# Patient Record
Sex: MALE | Race: WHITE | Employment: FULL TIME | URBAN - METROPOLITAN AREA
[De-identification: names, ages, dates, MRNs, and addresses within clinical notes are randomized per-mention and may not be internally consistent; named-entity substitution may affect disease eponyms.]

---

## 2018-01-19 ENCOUNTER — OFFICE VISIT (OUTPATIENT)
Dept: FAMILY MEDICINE CLINIC | Age: 48
End: 2018-01-19

## 2018-01-19 VITALS
HEART RATE: 70 BPM | SYSTOLIC BLOOD PRESSURE: 130 MMHG | OXYGEN SATURATION: 98 % | RESPIRATION RATE: 16 BRPM | TEMPERATURE: 97.6 F | DIASTOLIC BLOOD PRESSURE: 86 MMHG | WEIGHT: 189 LBS | BODY MASS INDEX: 26.46 KG/M2 | HEIGHT: 71 IN

## 2018-01-19 DIAGNOSIS — R53.83 FEELING TIRED: Primary | ICD-10-CM

## 2018-01-19 DIAGNOSIS — F17.200 SMOKER: ICD-10-CM

## 2018-01-19 DIAGNOSIS — E78.1 HYPERTRIGLYCERIDEMIA: ICD-10-CM

## 2018-01-19 DIAGNOSIS — M51.26 LUMBAR DISC HERNIATION: ICD-10-CM

## 2018-01-19 DIAGNOSIS — Z23 ENCOUNTER FOR IMMUNIZATION: ICD-10-CM

## 2018-01-19 DIAGNOSIS — Z13.1 SCREENING FOR DIABETES MELLITUS (DM): ICD-10-CM

## 2018-01-19 DIAGNOSIS — M25.50 POLYARTHRALGIA: ICD-10-CM

## 2018-01-19 DIAGNOSIS — Z86.19 HISTORY OF ROCKY MOUNTAIN SPOTTED FEVER: ICD-10-CM

## 2018-01-19 NOTE — LETTER
3/6/2018 7:02 PM 
 
Mr. Jaz Almeida [address] [place] [id number] Dear Jaz Almeida: 
 
Please find your most recent results below. Resulted Orders CBC WITH AUTOMATED DIFF Result Value Ref Range WBC 7.1 3.4 - 10.8 x10E3/uL  
 RBC 5.14 4.14 - 5.80 x10E6/uL HGB 15.9 13.0 - 17.7 g/dL HCT 48.1 37.5 - 51.0 % MCV 94 79 - 97 fL  
 MCH 30.9 26.6 - 33.0 pg  
 MCHC 33.1 31.5 - 35.7 g/dL  
 RDW 13.5 12.3 - 15.4 % PLATELET 532 325 - 244 x10E3/uL NEUTROPHILS 65 Not Estab. % Lymphocytes 20 Not Estab. % MONOCYTES 12 Not Estab. % EOSINOPHILS 3 Not Estab. % BASOPHILS 0 Not Estab. %  
 ABS. NEUTROPHILS 4.6 1.4 - 7.0 x10E3/uL Abs Lymphocytes 1.4 0.7 - 3.1 x10E3/uL  
 ABS. MONOCYTES 0.8 0.1 - 0.9 x10E3/uL  
 ABS. EOSINOPHILS 0.2 0.0 - 0.4 x10E3/uL  
 ABS. BASOPHILS 0.0 0.0 - 0.2 x10E3/uL IMMATURE GRANULOCYTES 0 Not Estab. %  
 ABS. IMM. GRANS. 0.0 0.0 - 0.1 x10E3/uL Narrative Performed at:  [address]  [id number] : Mirian Robertson MD, Phone:  [phone] METABOLIC PANEL, COMPREHENSIVE Result Value Ref Range Glucose 99 65 - 99 mg/dL BUN 10 6 - 24 mg/dL Creatinine 0.87 0.76 - 1.27 mg/dL GFR est non- >59 GFR est  >59 BUN/Creatinine ratio 11 9 - 20 Sodium 138 134 - 144 mmol/L Potassium 4.6 3.5 - 5.2 mmol/L Chloride 101 96 - 106 mmol/L  
 CO2 21 18 - 29 mmol/L Calcium 9.4 8.7 - 10.2 mg/dL Protein, total 6.4 6.0 - 8.5 g/dL Albumin 4.2 3.5 - 5.5 g/dL GLOBULIN, TOTAL 2.2 1.5 - 4.5 A-G Ratio 1.9 1.2 - 2.2 Bilirubin, total 0.8 0.0 - 1.2 mg/dL Alk. phosphatase 78 39 - 117 IU/L  
 AST (SGOT) 25 0 - 40 IU/L  
 ALT (SGPT) 23 0 - 44 IU/L Narrative Performed at:  [address]  [id number] : Mirian Robertson MD, Phone:  [phone] LIPID PANEL Result Value Ref Range Cholesterol, total 165 100 - 199 mg/dL Triglyceride 128 0 - 149 mg/dL HDL Cholesterol 42 >39 mg/dL VLDL, calculated 26 5 - 40 LDL, calculated 97 0 - 99 Narrative Performed at:  [address]  [id number] : Dot Abbasi MD, Phone:  [phone] TESTOSTERONE, FREE & TOTAL Result Value Ref Range Testosterone 713 264 - 916 ng/dL Comment:  
   Adult male reference interval is based on a population of 
healthy nonobese males (BMI <30) between 23and 44years old. [address] [place] [address], [address] [place] [address] [phone]. PMID: 94015432. Free testosterone (Direct) 12.1 6.8 - 21.5 pg/mL Narrative Performed at:  [address]  [id number] : Dot Abbasi MD, Phone:  [phone] VITAMIN B12 & FOLATE Result Value Ref Range Vitamin B12 277 232 - 1245 pg/mL Folate 7.1 >3.0 ng/mL Comment: A serum folate concentration of less than 3.1 ng/mL is 
considered to represent clinical deficiency. Narrative Performed at:  [address]  [id number] : Dot Abbasi MD, Phone:  [phone] R RICKETTSII IGM Result Value Ref Range R. rickettsii IgM 1.46 (H) 0.00 - 0.89 index Comment:  
                                    Negative        <0.90 Equivocal 0.90 - 1.10 Positive        >1.10 Narrative Performed at:  [address]  [id number] : Dot Abbasi MD, Phone:  [phone] HEMOGLOBIN A1C W/O EAG Result Value Ref Range Hemoglobin A1c 5.0 4.8 - 5.6 % Comment:  
            Pre-diabetes: 5.7 - 6.4 Diabetes: >6.4 Glycemic control for adults with diabetes: <7.0 Narrative Performed at:  [place] [address] [address]  [id number] : Placido Cole MD, Phone:  [phone] VITAMIN D, 25 HYDROXY Result Value Ref Range VITAMIN D, 25-HYDROXY 29.5 (L) 30.0 - 100.0 ng/mL Comment:  
   Vitamin D deficiency has been defined by the [address] [place] [address] practice guideline as a 
level of serum 25-OH vitamin D less than 20 ng/mL (1,2). The Endocrine Society went on to further define vitamin D 
insufficiency as a level between 21 and 29 ng/mL (2). 1. IOM ([place] of Medicine). 2010. Dietary reference 
   intakes for calcium and D. [address] [place] [address]: ShopClues.com. 2. Amelia MF, Celina NC, Angeli CLEMENT, et al. 
   Evaluation, treatment, and prevention of vitamin D 
   deficiency: an Endocrine Society clinical practice 
   guideline. JCEM. 2011 Jul; 96(7):1911-30. Narrative Performed at:  [address]  [id number] : Placido Cole MD, Phone:  [phone] TSH 3RD GENERATION Result Value Ref Range TSH 1.020 0.450 - 4.500 uIU/mL Narrative Performed at:  [address]  [id number] : Placido Cole MD, Phone:  [phone] LYME AB TOTAL W/RFLX W BLOT Result Value Ref Range Lyme Ab, IgG/IgM <0.91 0.00 - 0.90 ISR Comment:  
                                   Negative         <0.91 Equivocal  0.91 - 1.09 Positive         >1.09 Narrative Performed at:  [address]  [id number] : Placido Cole MD, Phone:  [phone] SED RATE (ESR) Result Value Ref Range Sed rate (ESR) 7 0 - 15 mm/hr Narrative Performed at:  [address]  [id number] : Placido Cole MD, Phone:  [phone] CVD REPORT  
 Result Value Ref Range INTERPRETATION Note Comment:  
   Supplemental report is available. Narrative Performed at:  [address] [place] [address]  [id number] : Shoshana Galloway PhD, Phone:  [phone] RECOMMENDATIONS:Your labs show sone abnormalities (low Vit B 12 and Vit D) Appears to have antibody for mathew mountain spotted fever, however unclear if active and would require treatment. Please call the office and schedule an appointment. I have been unable to contact you by phone. Please call me if you have any questions: [phone] Sincerely, 
 
 
Joanne Al MD

## 2018-01-19 NOTE — PROGRESS NOTES
1. Have you been to the ER, urgent care clinic since your last visit? Hospitalized since your last visit? No    2. Have you seen or consulted any other health care providers outside of the [address] [place] [address] since your last visit? Include any pap smears or colon screening. No  Flulaval 0.5 ml given IM in left deltoid. Lot # [id number], exp date 06/30/2018. Patient tolerated injection well. No adverse reaction noted.

## 2018-01-19 NOTE — MR AVS SNAPSHOT
[facility] [place] [address] [address] [facility] 
[phone] Patient: Jaz Almeida MRN: [id number] :1970 Visit Information Date & Time Provider Department Dept. Phone Encounter #  
 2018  1:30 PM Too [place], [address] [id number] Follow-up Instructions Return in about 3 weeks (around 2018), or if symptoms worsen or fail to improve. Upcoming Health Maintenance Date Due Pneumococcal 19-64 Medium Risk (1 of 1 - PPSV23) 10/14/1987 Influenza Age 5 to Adult 2017 DTaP/Tdap/Td series (2 - Td) 2019 Allergies as of 2018  Review Complete On: 2018 By: Sherin Donis LPN No Known Allergies Current Immunizations  Never Reviewed Name Date Tdap 2009 Not reviewed this visit You Were Diagnosed With   
  
 Codes Comments Feeling tired    -  Primary ICD-10-CM: R53.83 ICD-9-CM: 780.79 Hypertriglyceridemia     ICD-10-CM: E78.1 ICD-9-CM: 272.1 Polyarthralgia     ICD-10-CM: M25.50 ICD-9-CM: 719.49 Screening for diabetes mellitus (DM)     ICD-10-CM: Z13.1 ICD-9-CM: V77.1 History of Jose E Mountain spotted fever     ICD-10-CM: Z86.19 ICD-9-CM: V12.09 Lumbar disc herniation     ICD-10-CM: M51.26 
ICD-9-CM: 722.10 Smoker     ICD-10-CM: E96.374 ICD-9-CM: 305.1 Vitals BP Pulse Temp Resp Height(growth percentile) Weight(growth percentile) 130/86 (BP 1 Location: Left arm, BP Patient Position: Sitting) 70 97.6 °F (36.4 °C) (Oral) 16 5' 11\" (1.803 m) 189 lb (85.7 kg) SpO2 BMI Smoking Status 98% 26.36 kg/m2 Current Every Day Smoker Vitals History BMI and BSA Data Body Mass Index Body Surface Area  
 26.36 kg/m 2 2.07 m 2 Your Updated Medication List  
  
Notice  As of 2018  2:10 PM  
 You have not been prescribed any medications. Follow-up Instructions Return in about 3 weeks (around 2/9/2018), or if symptoms worsen or fail to improve. To-Do List   
 01/19/2018 Lab:  CBC WITH AUTOMATED DIFF   
  
 01/19/2018 Lab:  HEMOGLOBIN A1C W/O EAG   
  
 01/19/2018 Lab:  LIPID PANEL   
  
 01/19/2018 Lab:  LYME AB TOTAL W/RFLX W BLOT   
  
 01/19/2018 Lab:  METABOLIC PANEL, COMPREHENSIVE   
  
 01/19/2018 Lab:  R RICKETTSII IGM   
  
 01/19/2018 Lab:  SED RATE (ESR)   
  
 01/19/2018 Lab:  TESTOSTERONE, FREE & TOTAL   
  
 01/19/2018 Lab:  TSH 3RD GENERATION   
  
 01/19/2018 Lab:  VITAMIN B12 & FOLATE   
  
 01/19/2018 Lab:  VITAMIN D, 25 HYDROXY Patient Instructions Stopping Smoking: Care Instructions Your Care Instructions Cigarette smokers crave the nicotine in cigarettes. Giving it up is much harder than simply changing a habit. Your body has to stop craving the nicotine. It is hard to quit, but you can do it. There are many tools that people use to quit smoking. You may find that combining tools works best for you. There are several steps to quitting. First you get ready to quit. Then you get support to help you. After that, you learn new skills and behaviors to become a nonsmoker. For many people, a necessary step is getting and using medicine. Your doctor will help you set up the plan that best meets your needs. You may want to attend a smoking cessation program to help you quit smoking. When you choose a program, look for one that has proven success. Ask your doctor for ideas. You will greatly increase your chances of success if you take medicine as well as get counseling or join a cessation program. 
Some of the changes you feel when you first quit tobacco are uncomfortable. Your body will miss the nicotine at first, and you may feel short-tempered and grumpy. You may have trouble sleeping or concentrating. Medicine can help you deal with these symptoms. You may struggle with changing your smoking habits and rituals. The last step is the tricky one: Be prepared for the smoking urge to continue for a time. This is a lot to deal with, but keep at it. You will feel better. Follow-up care is a key part of your treatment and safety. Be sure to make and go to all appointments, and call your doctor if you are having problems. It's also a good idea to know your test results and keep a list of the medicines you take. How can you care for yourself at home? · Ask your family, friends, and coworkers for support. You have a better chance of quitting if you have help and support. · Join a support group, such as Nicotine Anonymous, for people who are trying to quit smoking. · Consider signing up for a smoking cessation program, such as the American Lung Association's Freedom from Smoking program. 
· Set a quit date. Pick your date carefully so that it is not right in the middle of a big deadline or stressful time. Once you quit, do not even take a puff. Get rid of all ashtrays and lighters after your last cigarette. Clean your house and your clothes so that they do not smell of smoke. · Learn how to be a nonsmoker. Think about ways you can avoid those things that make you reach for a cigarette. ¨ Avoid situations that put you at greatest risk for smoking. For some people, it is hard to have a drink with friends without smoking. For others, they might skip a coffee break with coworkers who smoke. ¨ Change your daily routine. Take a different route to work or eat a meal in a different place. · Cut down on stress. Calm yourself or release tension by doing an activity you enjoy, such as reading a book, taking a hot bath, or gardening. · Talk to your doctor or pharmacist about nicotine replacement therapy, which replaces the nicotine in your body.  You still get nicotine but you do not use tobacco. Nicotine replacement products help you slowly reduce the amount of nicotine you need. These products come in several forms, many of them available over-the-counter: ¨ Nicotine patches ¨ Nicotine gum and lozenges ¨ Nicotine inhaler · Ask your doctor about bupropion (Wellbutrin) or varenicline (Chantix), which are prescription medicines. They do not contain nicotine. They help you by reducing withdrawal symptoms, such as stress and anxiety. · Some people find hypnosis, acupuncture, and massage helpful for ending the smoking habit. · Eat a healthy diet and get regular exercise. Having healthy habits will help your body move past its craving for nicotine. · Be prepared to keep trying. Most people are not successful the first few times they try to quit. Do not get mad at yourself if you smoke again. Make a list of things you learned and think about when you want to try again, such as next week, next month, or next year. Where can you learn more? Go to http://jyotsna-amy.info/. Enter X445 in the search box to learn more about \"Stopping Smoking: Care Instructions. \" Current as of: March 20, 2017 Content Version: 11.4 © 2303-6849 FRESS. Care instructions adapted under license by [employer] (which disclaims liability or warranty for this information). If you have questions about a medical condition or this instruction, always ask your healthcare professional. [address] any warranty or liability for your use of this information. Introducing [facility]! Graeme Rodríguez introduces [facility] patient portal. Now you can access parts of your medical record, email your doctor's office, and request medication refills online. 1. In your internet browser, go to [email or web address] 2. Click on the First Time User? Click Here link in the Sign In box. You will see the New Member Sign Up page. 3. Enter your [employer] Access Code exactly as it appears below. You will not need to use this code after youve completed the sign-up process. If you do not sign up before the expiration date, you must request a new code. · [employer] Access Code: [email or web address] Expires: 4/19/2018  2:10 PM 
 
4. Enter the last four digits of your Social Security Number (xxxx) and Date of Birth (mm/dd/yyyy) as indicated and click Submit. You will be taken to the next sign-up page. 5. Create a [employer] ID. This will be your [employer] login ID and cannot be changed, so think of one that is secure and easy to remember. 6. Create a [employer] password. You can change your password at any time. 7. Enter your Password Reset Question and Answer. This can be used at a later time if you forget your password. 8. Enter your e-mail address. You will receive e-mail notification when new information is available in [address] 9. Click Sign Up. You can now view and download portions of your medical record. 10. Click the Download Summary menu link to download a portable copy of your medical information. If you have questions, please visit the Frequently Asked Questions section of the [employer] website. Remember, [employer] is NOT to be used for urgent needs. For medical emergencies, dial 911. Now available from your iPhone and Android! Please provide this summary of care documentation to your next provider. Lyme Disease Testing Disclaimer:   
 § 48.0-1346.4. (Expires July 1, 2018) Lyme disease testing information disclosure. A. Every licensee or his in-office designee who orders a laboratory test for the presence of Lyme disease shall provide to the patient or his legal representative the following written information: \"ACCORDING TO THE CENTERS FOR DISEASE CONTROL AND PREVENTION, AS OF 2011 LYME DISEASE IS THE SIXTH FASTEST GROWING DISEASE IN THE [place]. YOUR HEALTH CARE PROVIDER HAS ORDERED A LABORATORY TEST FOR THE PRESENCE OF LYME DISEASE FOR YOU. CURRENT LABORATORY TESTING FOR LYME DISEASE CAN BE PROBLEMATIC AND STANDARD LABORATORY TESTS OFTEN RESULT IN FALSE NEGATIVE AND FALSE POSITIVE RESULTS, AND IF DONE TOO EARLY, YOU MAY NOT HAVE PRODUCED ENOUGH ANTIBODIES TO BE CONSIDERED POSITIVE BECAUSE YOUR IMMUNE RESPONSE REQUIRES TIME TO DEVELOP ANTIBODIES. IF YOU ARE TESTED FOR LYME DISEASE, AND THE RESULTS ARE NEGATIVE, THIS DOES NOT NECESSARILY MEAN YOU DO NOT HAVE LYME DISEASE. IF YOU CONTINUE TO EXPERIENCE SYMPTOMS, YOU SHOULD CONTACT YOUR HEALTH CARE PROVIDER AND INQUIRE ABOUT THE APPROPRIATENESS OF RETESTING OR ADDITIONAL TREATMENT. \"  
B. Licensees shall be immune from civil liability for the provision of the written information required by this section absent gross negligence or willful misconduct. Your primary care clinician is listed as Amauri Morocho. If you have any questions after today's visit, please call [phone].

## 2018-01-19 NOTE — PROGRESS NOTES
Yanely Card, 50 y.o.,  male    SUBJECTIVE  Wants lab check for fatigue    Has not been seen here for almost 5 years. Fatigue- ongoing for years, no energy, exhausted throughout the day, with aching of joints hips, knees, elbows. Wants to be checked for lymes with history of tick bites [place] He mentions a friend with similar symptoms and being dx with this. He also wanted to be checked for RMSF,  testing positive for mathew mountain spotted fever years ago. He denies fevers, rash. He is a longshoreman, varied night shift worker. Sleep is bothersome. H/o herniated disc, following spine specialist in [place], forgot the name. Says given flexeril. Smoker    ROS:  See HPI, all others negative        Patient Active Problem List   Diagnosis Code    Lumbar disc herniation M51.26    Smoker F17.200           No Known Allergies    Past Medical History:   Diagnosis Date    DDD (degenerative disc disease), lumbar        Social History     Social History    Marital status:      Spouse name: N/A    Number of children: N/A    Years of education: N/A     Occupational History    Not on file. Social History Main Topics    Smoking status: Current Every Day Smoker     Packs/day: 1.00     Years: 15.00    Smokeless tobacco: Never Used    Alcohol use Yes    Drug use: No    Sexual activity: Yes     Partners: Female     Other Topics Concern    Not on file     Social History Narrative       Family History   Problem Relation Age of Onset    Heart Disease Mother     Hypertension Father          OBJECTIVE    Physical Exam:     Visit Vitals    /86 (BP 1 Location: Left arm, BP Patient Position: Sitting)    Pulse 70    Temp 97.6 °F (36.4 °C) (Oral)    Resp 16    Ht 5' 11\" (1.803 m)    Wt 189 lb (85.7 kg)    SpO2 98%    BMI 26.36 kg/m2       General: alert,  in no apparent distress or pain  Head: atraumatic.  Non-tender maxillary and frontal sinuses  Eyes: Lids with no discharge, no matting, conjunctivae clear and non injected, full EOMs, PERLLA  Ears: pinna non-tender, external auditory canal patent, TM intact  Mouth/throat:tonsils non enlarged, pharynx non erythematous and no lesion, nasal mucosa normal  Neck: supple, no adenopathy palpated  CVS: normal rate, regular rhythm, distinct S1 and S2  Lungs:clear to ausculation bilaterally, no crackles, wheezing or rhonchi noted  Abdomen: normoactive bowel sounds, soft, non-tender  Extremities: no edema, no cyanosis,  Skin: warm, no lesions, rashes noted  Psych:  mood and affect normal        ASSESSMENT/PLAN  Diagnoses and all orders for this visit:    1. Feeling tired  Chronic, nonspecific  start work up  -     CBC WITH AUTOMATED DIFF; Future  -     TSH 3RD GENERATION; Future  -     SED RATE (ESR); Future  -     TESTOSTERONE, FREE & TOTAL; Future  -     LYME AB TOTAL W/RFLX W BLOT; Future  -     VITAMIN D, 25 HYDROXY; Future  -     VITAMIN B12 & FOLATE; Future    2. Hypertriglyceridemia  h/o  -     METABOLIC PANEL, COMPREHENSIVE; Future  -     LIPID PANEL; Future  -     SED RATE (ESR); Future  -     TESTOSTERONE, FREE & TOTAL; Future  -     LYME AB TOTAL W/RFLX W BLOT; Future    3. Polyarthralgia  Likely OA type, will check for inflammatory arthritis and serology per pt request  -     SED RATE (ESR); Future  -     TESTOSTERONE, FREE & TOTAL; Future  -     LYME AB TOTAL W/RFLX W BLOT; Future    4. Screening for diabetes mellitus (DM)  -     HEMOGLOBIN A1C W/O EAG; Future    5. History of [facility] spotted fever  -     R RICKETTSII IGM; Future    6. Lumbar disc herniation  Following spine specialist, advised to send us name     7. Smoker  Counseled on quitting    8. Encounter for immunization  -     Influenza virus vaccine (QUADRIVALENT PRES FREE SYRINGE) IM ([id number])        Follow-up Disposition:  Return in about 3 weeks (around 2/9/2018), or if symptoms worsen or fail to improve. Patient understands plan of care. Patient has provided input and agrees with goals.

## 2018-01-19 NOTE — PATIENT INSTRUCTIONS

## 2018-02-21 ENCOUNTER — HOSPITAL ENCOUNTER (OUTPATIENT)
Dept: LAB | Age: 48
Discharge: HOME | End: 2018-02-21

## 2018-02-21 PROCEDURE — 99001 SPECIMEN HANDLING PT-LAB: CPT | Performed by: FAMILY MEDICINE

## 2018-02-23 LAB
25(OH)D3+25(OH)D2 SERPL-MCNC: 29.5 NG/ML (ref 30–100)
ALBUMIN SERPL-MCNC: 4.2 G/DL (ref 3.5–5.5)
ALBUMIN/GLOB SERPL: 1.9 {RATIO} (ref 1.2–2.2)
ALP SERPL-CCNC: 78 IU/L (ref 39–117)
ALT SERPL-CCNC: 23 IU/L (ref 0–44)
AST SERPL-CCNC: 25 IU/L (ref 0–40)
B BURGDOR IGG+IGM SER-ACNC: <0.91 ISR (ref 0–0.9)
BASOPHILS # BLD AUTO: 0 X10E3/UL (ref 0–0.2)
BASOPHILS NFR BLD AUTO: 0 %
BILIRUB SERPL-MCNC: 0.8 MG/DL (ref 0–1.2)
BUN SERPL-MCNC: 10 MG/DL (ref 6–24)
BUN/CREAT SERPL: 11 (ref 9–20)
CALCIUM SERPL-MCNC: 9.4 MG/DL (ref 8.7–10.2)
CHLORIDE SERPL-SCNC: 101 MMOL/L (ref 96–106)
CHOLEST SERPL-MCNC: 165 MG/DL (ref 100–199)
CO2 SERPL-SCNC: 21 MMOL/L (ref 18–29)
CREAT SERPL-MCNC: 0.87 MG/DL (ref 0.76–1.27)
EOSINOPHIL # BLD AUTO: 0.2 X10E3/UL (ref 0–0.4)
EOSINOPHIL NFR BLD AUTO: 3 %
ERYTHROCYTE [DISTWIDTH] IN BLOOD BY AUTOMATED COUNT: 13.5 % (ref 12.3–15.4)
ERYTHROCYTE [SEDIMENTATION RATE] IN BLOOD BY WESTERGREN METHOD: 7 MM/HR (ref 0–15)
FOLATE SERPL-MCNC: 7.1 NG/ML
GFR SERPLBLD CREATININE-BSD FMLA CKD-EPI: 101 ML/MIN/{1.73_M2}
GFR SERPLBLD CREATININE-BSD FMLA CKD-EPI: 117 ML/MIN/{1.73_M2}
GLOBULIN SER CALC-MCNC: 2.2 G/L (ref 1.5–4.5)
GLUCOSE SERPL-MCNC: 99 MG/DL (ref 65–99)
HBA1C MFR BLD: 5 % (ref 4.8–5.6)
HCT VFR BLD AUTO: 48.1 % (ref 37.5–51)
HDLC SERPL-MCNC: 42 MG/DL
HGB BLD-MCNC: 15.9 G/DL (ref 13–17.7)
IMM GRANULOCYTES # BLD: 0 X10E3/UL (ref 0–0.1)
IMM GRANULOCYTES NFR BLD: 0 %
INTERPRETATION, 910389: NORMAL
LDLC SERPL CALC-MCNC: 97 MG/DL (ref 0–99)
LYMPHOCYTES # BLD AUTO: 1.4 X10E3/UL (ref 0.7–3.1)
LYMPHOCYTES NFR BLD AUTO: 20 %
MCH RBC QN AUTO: 30.9 PG (ref 26.6–33)
MCHC RBC AUTO-ENTMCNC: 33.1 G/DL (ref 31.5–35.7)
MCV RBC AUTO: 94 FL (ref 79–97)
MONOCYTES # BLD AUTO: 0.8 X10E3/UL (ref 0.1–0.9)
MONOCYTES NFR BLD AUTO: 12 %
NEUTROPHILS # BLD AUTO: 4.6 X10E3/UL (ref 1.4–7)
NEUTROPHILS NFR BLD AUTO: 65 %
PLATELET # BLD AUTO: 252 X10E3/UL (ref 150–379)
POTASSIUM SERPL-SCNC: 4.6 MMOL/L (ref 3.5–5.2)
PROT SERPL-MCNC: 6.4 G/DL (ref 6–8.5)
R RICKETTSI IGM SER-ACNC: 1.46 INDEX (ref 0–0.89)
RBC # BLD AUTO: 5.14 X10E6/UL (ref 4.14–5.8)
SODIUM SERPL-SCNC: 138 MMOL/L (ref 134–144)
TESTOST FREE SERPL-MCNC: 12.1 PG/ML (ref 6.8–21.5)
TESTOST SERPL-MCNC: 713 NG/DL (ref 264–916)
TRIGL SERPL-MCNC: 128 MG/DL (ref 0–149)
TSH SERPL DL<=0.005 MIU/L-ACNC: 1.02 UIU/ML (ref 0.45–4.5)
VIT B12 SERPL-MCNC: 277 PG/ML (ref 232–1245)
VLDLC SERPL CALC-MCNC: 26 MG/DL (ref 5–40)
WBC # BLD AUTO: 7.1 X10E3/UL (ref 3.4–10.8)

## 2018-02-27 NOTE — PROGRESS NOTES
Due for ff-up, pls schedule  Some abnormalities ( low vit b12, vit D)  Appears to have antibody for mathew mountain spotted fever, however unclear if active and would require treatment  Will refer to Infectious disease specialist Dr. Jeb Burnham.  pls notify pt and would order referral after he has been notified

## 2018-04-12 ENCOUNTER — APPOINTMENT (OUTPATIENT)
Age: 48
End: 2018-04-12
Attending: EMERGENCY MEDICINE
Payer: COMMERCIAL

## 2018-04-12 ENCOUNTER — APPOINTMENT (OUTPATIENT)
Dept: GENERAL RADIOLOGY | Age: 48
End: 2018-04-12
Attending: EMERGENCY MEDICINE
Payer: COMMERCIAL

## 2018-04-12 ENCOUNTER — APPOINTMENT (OUTPATIENT)
Dept: CT IMAGING | Age: 48
End: 2018-04-12
Attending: EMERGENCY MEDICINE
Payer: COMMERCIAL

## 2018-04-12 ENCOUNTER — HOSPITAL ENCOUNTER (EMERGENCY)
Age: 48
Discharge: HOME | End: 2018-04-12
Attending: EMERGENCY MEDICINE
Payer: COMMERCIAL

## 2018-04-12 ENCOUNTER — OFFICE VISIT (OUTPATIENT)
Dept: FAMILY MEDICINE CLINIC | Age: 48
End: 2018-04-12

## 2018-04-12 VITALS
SYSTOLIC BLOOD PRESSURE: 138 MMHG | HEART RATE: 66 BPM | HEIGHT: 71 IN | TEMPERATURE: 97.9 F | OXYGEN SATURATION: 100 % | WEIGHT: 189 LBS | BODY MASS INDEX: 26.46 KG/M2 | RESPIRATION RATE: 16 BRPM | DIASTOLIC BLOOD PRESSURE: 90 MMHG

## 2018-04-12 VITALS
SYSTOLIC BLOOD PRESSURE: 128 MMHG | WEIGHT: 189 LBS | RESPIRATION RATE: 16 BRPM | HEART RATE: 82 BPM | BODY MASS INDEX: 26.46 KG/M2 | HEIGHT: 71 IN | TEMPERATURE: 98.4 F | DIASTOLIC BLOOD PRESSURE: 100 MMHG | OXYGEN SATURATION: 98 %

## 2018-04-12 DIAGNOSIS — R03.0 ELEVATED BP WITHOUT DIAGNOSIS OF HYPERTENSION: ICD-10-CM

## 2018-04-12 DIAGNOSIS — R03.0 ELEVATED BLOOD PRESSURE READING WITHOUT DIAGNOSIS OF HYPERTENSION: ICD-10-CM

## 2018-04-12 DIAGNOSIS — Z86.19 HISTORY OF ROCKY MOUNTAIN SPOTTED FEVER: ICD-10-CM

## 2018-04-12 DIAGNOSIS — R20.0 NUMBNESS AND TINGLING OF LEFT ARM AND LEG: ICD-10-CM

## 2018-04-12 DIAGNOSIS — F17.200 NICOTINE USE DISORDER: ICD-10-CM

## 2018-04-12 DIAGNOSIS — R20.0 NUMBNESS AND TINGLING OF LEFT SIDE OF FACE: Primary | ICD-10-CM

## 2018-04-12 DIAGNOSIS — E55.9 VITAMIN D DEFICIENCY: ICD-10-CM

## 2018-04-12 DIAGNOSIS — R20.2 NUMBNESS AND TINGLING OF LEFT SIDE OF FACE: Primary | ICD-10-CM

## 2018-04-12 DIAGNOSIS — R20.2 PARESTHESIA: ICD-10-CM

## 2018-04-12 DIAGNOSIS — R42 DIZZINESS: Primary | ICD-10-CM

## 2018-04-12 DIAGNOSIS — F17.200 SMOKER: ICD-10-CM

## 2018-04-12 DIAGNOSIS — R20.2 NUMBNESS AND TINGLING OF LEFT ARM AND LEG: ICD-10-CM

## 2018-04-12 LAB
AMPHET UR QL SCN: NEGATIVE
ANION GAP SERPL CALC-SCNC: 7 MMOL/L (ref 3–18)
APPEARANCE UR: CLEAR
APTT PPP: 34.3 SEC (ref 23–36.4)
BARBITURATES UR QL SCN: NEGATIVE
BASOPHILS # BLD: 0 K/UL (ref 0–0.06)
BASOPHILS NFR BLD: 0 % (ref 0–2)
BENZODIAZ UR QL: NEGATIVE
BILIRUB UR QL: NEGATIVE
BUN SERPL-MCNC: 11 MG/DL (ref 7–18)
BUN/CREAT SERPL: 11 (ref 12–20)
CALCIUM SERPL-MCNC: 9.4 MG/DL (ref 8.5–10.1)
CANNABINOIDS UR QL SCN: NEGATIVE
CHLORIDE SERPL-SCNC: 102 MMOL/L (ref 100–108)
CK MB CFR SERPL CALC: 0.7 % (ref 0–4)
CK MB SERPL-MCNC: 1 NG/ML (ref 5–25)
CK SERPL-CCNC: 135 U/L (ref 39–308)
CO2 SERPL-SCNC: 29 MMOL/L (ref 21–32)
COCAINE UR QL SCN: NEGATIVE
COLOR UR: YELLOW
CREAT SERPL-MCNC: 1.01 MG/DL (ref 0.6–1.3)
DIFFERENTIAL METHOD BLD: ABNORMAL
EOSINOPHIL # BLD: 0.2 K/UL (ref 0–0.4)
EOSINOPHIL NFR BLD: 2 % (ref 0–5)
ERYTHROCYTE [DISTWIDTH] IN BLOOD BY AUTOMATED COUNT: 12.6 % (ref 11.6–14.5)
GLUCOSE BLD STRIP.AUTO-MCNC: 96 MG/DL (ref 70–110)
GLUCOSE SERPL-MCNC: 98 MG/DL (ref 74–99)
GLUCOSE UR STRIP.AUTO-MCNC: NEGATIVE MG/DL
HCT VFR BLD AUTO: 46.2 % (ref 36–48)
HDSCOM,HDSCOM: NORMAL
HGB BLD-MCNC: 15.9 G/DL (ref 13–16)
HGB UR QL STRIP: NEGATIVE
INR PPP: 1.2 (ref 0.8–1.2)
KETONES UR QL STRIP.AUTO: ABNORMAL MG/DL
LEUKOCYTE ESTERASE UR QL STRIP.AUTO: NEGATIVE
LYMPHOCYTES # BLD: 1.6 K/UL (ref 0.9–3.6)
LYMPHOCYTES NFR BLD: 19 % (ref 21–52)
MAGNESIUM SERPL-MCNC: 2 MG/DL (ref 1.6–2.6)
MCH RBC QN AUTO: 30.6 PG (ref 24–34)
MCHC RBC AUTO-ENTMCNC: 34.4 G/DL (ref 31–37)
MCV RBC AUTO: 89 FL (ref 74–97)
METHADONE UR QL: NEGATIVE
MONOCYTES # BLD: 0.7 K/UL (ref 0.05–1.2)
MONOCYTES NFR BLD: 8 % (ref 3–10)
NEUTS SEG # BLD: 6 K/UL (ref 1.8–8)
NEUTS SEG NFR BLD: 71 % (ref 40–73)
NITRITE UR QL STRIP.AUTO: NEGATIVE
OPIATES UR QL: NEGATIVE
PCP UR QL: NEGATIVE
PH UR STRIP: 5.5 [PH] (ref 5–8)
PLATELET # BLD AUTO: 233 K/UL (ref 135–420)
PMV BLD AUTO: 10.7 FL (ref 9.2–11.8)
POTASSIUM SERPL-SCNC: 3.9 MMOL/L (ref 3.5–5.5)
PROT UR STRIP-MCNC: NEGATIVE MG/DL
PROTHROMBIN TIME: 14.3 SEC (ref 11.5–15.2)
RBC # BLD AUTO: 5.19 M/UL (ref 4.7–5.5)
SODIUM SERPL-SCNC: 138 MMOL/L (ref 136–145)
SP GR UR REFRACTOMETRY: 1.01 (ref 1–1.03)
TROPONIN I SERPL-MCNC: <0.02 NG/ML (ref 0–0.06)
UROBILINOGEN UR QL STRIP.AUTO: 0.2 EU/DL (ref 0.2–1)
WBC # BLD AUTO: 8.5 K/UL (ref 4.6–13.2)

## 2018-04-12 PROCEDURE — 81003 URINALYSIS AUTO W/O SCOPE: CPT | Performed by: EMERGENCY MEDICINE

## 2018-04-12 PROCEDURE — 74011250636 HC RX REV CODE- 250/636: Performed by: EMERGENCY MEDICINE

## 2018-04-12 PROCEDURE — 83735 ASSAY OF MAGNESIUM: CPT | Performed by: EMERGENCY MEDICINE

## 2018-04-12 PROCEDURE — 74011250637 HC RX REV CODE- 250/637: Performed by: EMERGENCY MEDICINE

## 2018-04-12 PROCEDURE — 80048 BASIC METABOLIC PNL TOTAL CA: CPT | Performed by: EMERGENCY MEDICINE

## 2018-04-12 PROCEDURE — 99285 EMERGENCY DEPT VISIT HI MDM: CPT

## 2018-04-12 PROCEDURE — 70553 MRI BRAIN STEM W/O & W/DYE: CPT

## 2018-04-12 PROCEDURE — A9585 GADOBUTROL INJECTION: HCPCS | Performed by: EMERGENCY MEDICINE

## 2018-04-12 PROCEDURE — 80307 DRUG TEST PRSMV CHEM ANLYZR: CPT | Performed by: EMERGENCY MEDICINE

## 2018-04-12 PROCEDURE — 71045 X-RAY EXAM CHEST 1 VIEW: CPT

## 2018-04-12 PROCEDURE — 82962 GLUCOSE BLOOD TEST: CPT

## 2018-04-12 PROCEDURE — 85025 COMPLETE CBC W/AUTO DIFF WBC: CPT | Performed by: EMERGENCY MEDICINE

## 2018-04-12 PROCEDURE — 85610 PROTHROMBIN TIME: CPT | Performed by: EMERGENCY MEDICINE

## 2018-04-12 PROCEDURE — 85730 THROMBOPLASTIN TIME PARTIAL: CPT | Performed by: EMERGENCY MEDICINE

## 2018-04-12 PROCEDURE — 84484 ASSAY OF TROPONIN QUANT: CPT | Performed by: EMERGENCY MEDICINE

## 2018-04-12 PROCEDURE — 70549 MR ANGIOGRAPH NECK W/O&W/DYE: CPT

## 2018-04-12 PROCEDURE — 93005 ELECTROCARDIOGRAM TRACING: CPT

## 2018-04-12 PROCEDURE — 70450 CT HEAD/BRAIN W/O DYE: CPT

## 2018-04-12 RX ORDER — GABAPENTIN 100 MG/1
100 CAPSULE ORAL 3 TIMES DAILY
Status: DISCONTINUED | OUTPATIENT
Start: 2018-04-12 | End: 2018-04-12 | Stop reason: HOSPADM

## 2018-04-12 RX ORDER — GABAPENTIN 100 MG/1
100 CAPSULE ORAL 3 TIMES DAILY
Status: DISCONTINUED | OUTPATIENT
Start: 2018-04-12 | End: 2018-04-12

## 2018-04-12 RX ORDER — ASPIRIN 325 MG
325 TABLET ORAL
Status: COMPLETED | OUTPATIENT
Start: 2018-04-12 | End: 2018-04-12

## 2018-04-12 RX ADMIN — GABAPENTIN 100 MG: 100 CAPSULE ORAL at 17:12

## 2018-04-12 RX ADMIN — GADOBUTROL 10 ML: 604.72 INJECTION INTRAVENOUS at 18:56

## 2018-04-12 RX ADMIN — ASPIRIN 325 MG ORAL TABLET 325 MG: 325 PILL ORAL at 17:12

## 2018-04-12 NOTE — LETTER
NOTIFICATION RETURN TO WORK / SCHOOL 
 
4/12/2018 8:26 PM 
 
Mr. Isabela Tafoya [address] [place] [address] To Whom It May Concern: 
 
Isabela Tafoya is currently under the care of [address] [facility] EMERGENCY DEPT. He will return to work/school on: Friday night 4/13/18 If there are questions or concerns please have the patient contact our office. Sincerely, 
 
 
Dr. Fanny Levi

## 2018-04-12 NOTE — PROGRESS NOTES
Philomena Banks, 50 y.o.,  male    SUBJECTIVE  Left sided pain since 6 pm yesterday    Pt reports feeling dizzy, burning sensation on L side of body, extremities and trunk. Says feels unwell in general. No slurring of speech, no blurring of vision, no weakness. Denies neck pain or previous injury. He is a smoker. Recent cholesterol level satisfactory, no fam h/o CVA/CAD. Has has had fatigue for several years now, work up testing from previous visit showed mildly low vit b12/vit d. And + titer for mathew mountain spotted fever, which he was dx and treated for last year. He was lost to ff-up, at that time we recommended evaluation by ID.       ROS:  See HPI, all others negative        Patient Active Problem List   Diagnosis Code    Lumbar disc herniation M51.26    Smoker F17.200       Facility-Administered Medications Ordered in Other Visits   Medication Dose Route Frequency Provider Last Rate Last Dose    gabapentin (NEURONTIN) capsule 100 mg  100 mg Oral TID Veto Claw, DO   100 mg at 04/12/18 1712       No Known Allergies    Past Medical History:   Diagnosis Date    DDD (degenerative disc disease), lumbar        Social History     Social History    Marital status:      Spouse name: N/A    Number of children: N/A    Years of education: N/A     Occupational History    Not on file.      Social History Main Topics    Smoking status: Current Every Day Smoker     Packs/day: 1.00     Years: 15.00    Smokeless tobacco: Never Used    Alcohol use Yes    Drug use: No    Sexual activity: Yes     Partners: Female     Other Topics Concern    Not on file     Social History Narrative       Family History   Problem Relation Age of Onset    Heart Disease Mother     Hypertension Father          OBJECTIVE    Physical Exam:     Visit Vitals    BP (!) 128/100 (BP 1 Location: Left arm, BP Patient Position: Sitting)    Pulse 82    Temp 98.4 °F (36.9 °C) (Oral)    Resp 16    Ht 5' 11\" (1.803 m)    Wt 189 lb (85.7 kg)    SpO2 98%    BMI 26.36 kg/m2       General: alert, well-appearing,  in no apparent distress or pain  Neck: supple, no adenopathy palpated  CVS: normal rate, regular rhythm, distinct S1 and S2  Lungs:clear to ausculation bilaterally, no crackles, wheezing or rhonchi noted  Abdomen: normoactive bowel sounds, soft, non-tender  Neurological: Alert  CN II:visual fields intact  CN III/IV/VI:  Full EOM  CN V: facial sensation normal bilaterally  CN VII: symmetric facial muscle  CN VIII: hearing intact bilaterally  CN IX/X: gag normal  CN XI: sternocleidomastoid strength normal/symmetric  CN XII: tongue midline  Motor exam: 5/5 in bilateral upper and lower extremities with normal tone and bulk and no drift. exam reveals alert, oriented, normal speech, no focal findings or movement disorder noted, neck supple without rigidity. Extremities: no edema, no cyanosis,  Skin: warm, no lesions, rashes noted  Psych:  mood and affect normal        ASSESSMENT/PLAN  Diagnoses and all orders for this visit:    1. Dizziness  Neuro exam benign however symptoms concerning for focal neuro paresthesia  Advised ED consult to r/o.     2. Paresthesia  -     REFERRAL TO INFECTIOUS DISEASE    3. Smoker  Encouraged cessation    4. Vitamin D deficiency    5. History of Jose E Mountain spotted fever  -     REFERRAL TO INFECTIOUS DISEASE    6. Elevated BP without diagnosis of hypertension    BMI > 25  Encouraged wt loss    Follow-up Disposition:  Depending on ED visit    Patient understands plan of care. Patient has provided input and agrees with goals.

## 2018-04-12 NOTE — ED PROVIDER NOTES
EMERGENCY DEPARTMENT HISTORY AND PHYSICAL EXAM    3:47 PM      Date: 4/12/2018  Patient Name: Cat Morales    History of Presenting Illness     Chief Complaint   Patient presents with    Numbness         History Provided By: Patient    Chief Complaint: Numbness   Duration: 21  Hours  Timing:  Constant  Location: Left Shoulder and Left leg   Quality: Burning sensation   Severity: Moderate  Modifying Factors: nothing makes the Sx better or worse   Associated Symptoms: headache, dizziness, left sided groin pain, fatigue, diaphoresis      Additional History (Context): Cat Morales is a 50 y.o. male with PMHx of DDD who presents c/o constant moderate numbness in his left shoulder and left leg onset x 21 hours ago. The numbness is described as a burning sensation in his shoulder and leg. Nothing makes the Sx better or worse. Associated Sx include a headache, dizziness, left sided groin pain, fatigue, and diaphoresis. States he went to work x 21 hours ago ( \"last night\" ) and broke out into a \"cold sweat\", and reports developing a headache and dizziness. Pt went home after the Sx started and admits to lying down for the rest of the evening. He was able to schedule an appointment today with his PCP and while being evaluated by his PCP (Dr. Luis Lynch) he was instructed to come to the ED. No medical problems. Denies anxiety, vomiting, back pain, weakness, back pain, neck pain. Denies any further complaints or symptoms at the moment. PCP: Margarito Nguyễn MD      Past History     Past Medical History:  Past Medical History:   Diagnosis Date    DDD (degenerative disc disease), lumbar        Past Surgical History:  History reviewed. No pertinent surgical history.     Family History:  Family History   Problem Relation Age of Onset    Heart Disease Mother     Hypertension Father        Social History:  Social History   Substance Use Topics    Smoking status: Current Every Day Smoker     Packs/day: 1.00 Years: 15.00    Smokeless tobacco: Never Used    Alcohol use Yes       Allergies:  No Known Allergies      Review of Systems       Review of Systems   Constitutional: Positive for diaphoresis and fatigue. Negative for fever. HENT: Negative for sore throat. Eyes: Negative for redness and visual disturbance. Respiratory: Negative for shortness of breath and wheezing. Cardiovascular: Negative for chest pain. Gastrointestinal: Negative for abdominal pain, nausea and vomiting. Endocrine: Negative for polyuria. Genitourinary: Negative for dysuria. Musculoskeletal: Negative for arthralgias, back pain, neck pain and neck stiffness. Skin: Negative for rash. Neurological: Positive for dizziness, numbness and headaches. Negative for weakness. All other systems reviewed and are negative. Physical Exam     Visit Vitals    /90    Pulse 66    Temp 97.9 °F (36.6 °C)    Resp 16    Ht 5' 11\" (1.803 m)    Wt 85.7 kg (189 lb)    SpO2 100%    BMI 26.36 kg/m2         Physical Exam   Constitutional: He is oriented to person, place, and time. He appears well-developed and well-nourished. No distress. HENT:   Head: Normocephalic and atraumatic. Mouth/Throat: Oropharynx is clear and moist.   Eyes: Conjunctivae and EOM are normal. Pupils are equal, round, and reactive to light. No scleral icterus. Neck: Normal range of motion. Neck supple. Cardiovascular: Intact distal pulses. Capillary refill < 3 seconds   Pulmonary/Chest: Effort normal and breath sounds normal. No respiratory distress. He has no wheezes. Abdominal: Soft. Bowel sounds are normal. He exhibits no distension. There is no tenderness. Musculoskeletal: Normal range of motion. He exhibits no edema. Negative for left trapezius cervical tenderness    Lymphadenopathy:     He has no cervical adenopathy. Neurological: He is alert and oriented to person, place, and time. No cranial nerve deficit. Sensation intact.  No cerebellar ataxia. No slurred speech. Strength 5/5. Skin: Skin is warm and dry. He is not diaphoretic. Nursing note and vitals reviewed. Diagnostic Study Results     Labs -  Recent Results (from the past 12 hour(s))   CBC WITH AUTOMATED DIFF    Collection Time: 04/12/18  4:00 PM   Result Value Ref Range    WBC 8.5 4.6 - 13.2 K/uL    RBC 5.19 4.70 - 5.50 M/uL    HGB 15.9 13.0 - 16.0 g/dL    HCT 46.2 36.0 - 48.0 %    MCV 89.0 74.0 - 97.0 FL    MCH 30.6 24.0 - 34.0 PG    MCHC 34.4 31.0 - 37.0 g/dL    RDW 12.6 11.6 - 14.5 %    PLATELET 598 564 - 599 K/uL    MPV 10.7 9.2 - 11.8 FL    NEUTROPHILS 71 40 - 73 %    LYMPHOCYTES 19 (L) 21 - 52 %    MONOCYTES 8 3 - 10 %    EOSINOPHILS 2 0 - 5 %    BASOPHILS 0 0 - 2 %    ABS. NEUTROPHILS 6.0 1.8 - 8.0 K/UL    ABS. LYMPHOCYTES 1.6 0.9 - 3.6 K/UL    ABS. MONOCYTES 0.7 0.05 - 1.2 K/UL    ABS. EOSINOPHILS 0.2 0.0 - 0.4 K/UL    ABS.  BASOPHILS 0.0 0.0 - 0.06 K/UL    DF AUTOMATED     METABOLIC PANEL, BASIC    Collection Time: 04/12/18  4:00 PM   Result Value Ref Range    Sodium 138 136 - 145 mmol/L    Potassium 3.9 3.5 - 5.5 mmol/L    Chloride 102 100 - 108 mmol/L    CO2 29 21 - 32 mmol/L    Anion gap 7 3.0 - 18 mmol/L    Glucose 98 74 - 99 mg/dL    BUN 11 7.0 - 18 MG/DL    Creatinine 1.01 0.6 - 1.3 MG/DL    BUN/Creatinine ratio 11 (L) 12 - 20      GFR est AA >60 >60 ml/min/1.73m2    GFR est non-AA >60 >60 ml/min/1.73m2    Calcium 9.4 8.5 - 10.1 MG/DL   PROTHROMBIN TIME + INR    Collection Time: 04/12/18  4:00 PM   Result Value Ref Range    Prothrombin time 14.3 11.5 - 15.2 sec    INR 1.2 0.8 - 1.2     PTT    Collection Time: 04/12/18  4:00 PM   Result Value Ref Range    aPTT 34.3 23.0 - 36.4 SEC   CARDIAC PANEL,(CK, CKMB & TROPONIN)    Collection Time: 04/12/18  4:00 PM   Result Value Ref Range     39 - 308 U/L    CK - MB 1.0 <3.6 ng/ml    CK-MB Index 0.7 0.0 - 4.0 %    Troponin-I, Qt. <0.02 0.00 - 0.06 NG/ML   MAGNESIUM    Collection Time: 04/12/18  4:00 PM   Result Value Ref Range    Magnesium 2.0 1.6 - 2.6 mg/dL   GLUCOSE, POC    Collection Time: 04/12/18  4:04 PM   Result Value Ref Range    Glucose (POC) 96 70 - 110 mg/dL   EKG, 12 LEAD, INITIAL    Collection Time: 04/12/18  5:19 PM   Result Value Ref Range    Ventricular Rate 67 BPM    Atrial Rate 67 BPM    P-R Interval 148 ms    QRS Duration 96 ms    Q-T Interval 416 ms    QTC Calculation (Bezet) 439 ms    Calculated P Axis 63 degrees    Calculated R Axis 74 degrees    Calculated T Axis 33 degrees    Diagnosis       Normal sinus rhythm  Septal infarct , age undetermined  Abnormal ECG  When compared with ECG of 08-JUL-2011 03:34,  No significant change was found     URINALYSIS W/ RFLX MICROSCOPIC    Collection Time: 04/12/18  5:36 PM   Result Value Ref Range    Color YELLOW      Appearance CLEAR      Specific gravity 1.006 1.005 - 1.030      pH (UA) 5.5 5.0 - 8.0      Protein NEGATIVE  NEG mg/dL    Glucose NEGATIVE  NEG mg/dL    Ketone TRACE (A) NEG mg/dL    Bilirubin NEGATIVE  NEG      Blood NEGATIVE  NEG      Urobilinogen 0.2 0.2 - 1.0 EU/dL    Nitrites NEGATIVE  NEG      Leukocyte Esterase NEGATIVE  NEG     DRUG SCREEN, URINE    Collection Time: 04/12/18  5:36 PM   Result Value Ref Range    BENZODIAZEPINES NEGATIVE  NEG      BARBITURATES NEGATIVE  NEG      THC (TH-CANNABINOL) NEGATIVE  NEG      OPIATES NEGATIVE  NEG      PCP(PHENCYCLIDINE) NEGATIVE  NEG      COCAINE NEGATIVE  NEG      AMPHETAMINES NEGATIVE  NEG      METHADONE NEGATIVE  NEG      HDSCOM (NOTE)        Radiologic Studies -   CT HEAD WO CONT   Final Result      XR CHEST PORT        IMPRESSION  IMPRESSION:     No acute findings. CT Head  IMPRESSION:  1. No acute intracranial findings. 2.  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 MRI brain:   IMPRESSION  IMPRESSION:     No acute findings. Minimal sequela of microvascular ischemic disease.   Left arachnoid cyst abutting the cerebellum      MRA neck: Prelim read by Radiologist; left vertebral is either atrophic or stenosed. No acute proximal stenosis otherwise. Medical Decision Making   I am the first provider for this patient. I reviewed the vital signs, available nursing notes, past medical history, past surgical history, family history and social history. Vital Signs-Reviewed the patient's vital signs. Pulse Oximetry Analysis - 100 % on RA, normal     Records Reviewed: Nursing Notes (Time of Review: 3:47 PM)    Provider Notes (Medical Decision Making):  MDM  Number of Diagnoses or Management Options  Diagnosis management comments: DDX: metabolic, anxiety, stroke, migraine, brain mass. Will consult teleneurology and CT head. Pt has no focal neuro abnormalities. He c/o a burning  sensation down face, arm, and leg started at 6:00 PM x 1 day ago. Pt is outside of TPA window. Medications   gabapentin (NEURONTIN) capsule 100 mg (100 mg Oral Given 4/12/18 1712)   aspirin (ASPIRIN) tablet 325 mg (325 mg Oral Given 4/12/18 1712)   gadobutrol (Gadavist) contrast solution 10 mL (10 mL IntraVENous Given 4/12/18 1856)         ED Course: Progress Notes, Reevaluation, and Consults:    Labs reassuring    5:10 PM Consult: I discussed care with Dr. Migdalia Lorenz (Teleneurologist). It was a standard discussion including patient history, chief complaint, available diagnostic results, and predicted treatment course. Wants to admit pt for a stroke work up since pt has numbness to the left side. Wants pt to have Neurontin 100 mg x 3 times per day. Wants MRI of brain and MRA neck without contrast.     6:00 PM- Pt does not want admission to hospital. I discussed the reasoning for admission and MRI. Pt is now having the MRI (agrees). Will check results of MRI. If results of MRI show a stroke, will re-discuss admission.  If negative will have pt discharged and consult Neurology for outpatient follow up.     7:55 PM  Consult:  Discussed care with Dr. Mary Robbins, Specialty: Teleneurologist.  Standard discussion; including history of patients chief complaint, available diagnostic results, and treatment course. He agrees pt can be discharged home with outpatient neurologist follow up. I have reassessed the patient. I have discussed the workup, results and plan with the patient and patient is in agreement. Patient has no new complaint. I discussed risks of nicotine including cva, ca, death. Patient understands. Patient was discharge in stable condition. Patient was given outpatient follow up. Patient is to return to emergency department if any new or worsening condition. 8:14 PM April 12, 2018        Diagnosis     Clinical Impression:   1. Numbness and tingling of left side of face    2. Numbness and tingling of left arm and leg    3. Elevated blood pressure reading without diagnosis of hypertension    4. Nicotine use disorder        Disposition: discharged    Follow-up Information     Follow up With Details Comments Contact Info    Nemo Zelaya MD Call in 1 day  [address] [facility] [id number] [place] [address] EMERGENCY DEPT  As needed, If symptoms worsen [address] [place] [address]  [phone]           Patient's Medications    No medications on file     _______________________________    Attestations:  Scribe Attestation     Gabriela Macdonald acting as a scribe for and in the presence of Diane Abraham DO      April 12, 2018 at 3:47 PM       Provider Attestation:      I personally performed the services described in the documentation, reviewed the documentation, as recorded by the scribe in my presence, and it accurately and completely records my words and actions.  April 12, 2018 at 3:47 PM - Diane Abraham DO    _______________________________

## 2018-04-12 NOTE — ED TRIAGE NOTES
Left sided tingling and \"burning\" that started last night after leaving work. C/o generalized fatigue x one month. No unilateral weakness.  Had episode of dizziness and diaphoresis yesterday

## 2018-04-13 NOTE — ED NOTES
Luis Angel Cisneros is a 50 y.o. male that was discharged in stable. Pt was accompanied by spouse. Pt is not driving. The patients diagnosis, condition and treatment were explained to  patient and aftercare instructions were given. The patient verbalized understanding. Patient armband removed and shredded.

## 2018-04-13 NOTE — DISCHARGE INSTRUCTIONS
Numbness and Tingling: Care Instructions  Your Care Instructions    Many things can cause numbness or tingling. Swelling may put pressure on a nerve. This could cause you to lose feeling or have a pins-and-needles sensation on part of your body. Nerves may be damaged from trauma, toxins, or diseases, such as diabetes or multiple sclerosis (MS). Sometimes, though, the cause is not clear. If there is no clear reason for your symptoms, and you are not having any other symptoms, your doctor may suggest watching and waiting for a while to see if the numbness or tingling goes away on its own. Your doctor may want you to have blood or nerve tests to find the cause of your symptoms. Follow-up care is a key part of your treatment and safety. Be sure to make and go to all appointments, and call your doctor if you are having problems. It's also a good idea to know your test results and keep a list of the medicines you take. How can you care for yourself at home? · If your doctor prescribes medicine, take it exactly as directed. Call your doctor if you think you are having a problem with your medicine. · If you have any swelling, put ice or a cold pack on the area for 10 to 20 minutes at a time. Put a thin cloth between the ice and your skin. When should you call for help? Call 911 anytime you think you may need emergency care. For example, call if:  ? · You have weakness, numbness, or tingling in both legs. ? · You lose bowel or bladder control. ? · You have symptoms of a stroke. These may include:  ¨ Sudden numbness, tingling, weakness, or loss of movement in your face, arm, or leg, especially on only one side of your body. ¨ Sudden vision changes. ¨ Sudden trouble speaking. ¨ Sudden confusion or trouble understanding simple statements. ¨ Sudden problems with walking or balance. ¨ A sudden, severe headache that is different from past headaches. ? Watch closely for changes in your health, and be sure to contact your doctor if you have any problems, or if:  ? · You do not get better as expected. Where can you learn more? Go to http://jyotsna-amy.info/. Enter N967 in the search box to learn more about \"Numbness and Tingling: Care Instructions. \"  Current as of: October 14, 2016  Content Version: 11.4  © 8277-2405 EvolveMol. Care instructions adapted under license by Glympse (which disclaims liability or warranty for this information). If you have questions about a medical condition or this instruction, always ask your healthcare professional. [address] any warranty or liability for your use of this information. Learning About Benefits From Quitting Smoking  How does quitting smoking make you healthier? If you're thinking about quitting smoking, you may have a few reasons to be smoke-free. Your health may be one of them. · When you quit smoking, you lower your risks for cancer, lung disease, heart attack, stroke, blood vessel disease, and blindness from macular degeneration. · When you're smoke-free, you get sick less often, and you heal faster. You are less likely to get colds, flu, bronchitis, and pneumonia. · As a nonsmoker, you may find that your mood is better and you are less stressed. When and how will you feel healthier? Quitting has real health benefits that start from day 1 of being smoke-free. And the longer you stay smoke-free, the healthier you get and the better you feel. The first hours  · After just 20 minutes, your blood pressure and heart rate go down. That means there's less stress on your heart and blood vessels. · Within 12 hours, the level of carbon monoxide in your blood drops back to normal. That makes room for more oxygen. With more oxygen in your body, you may notice that you have more energy than when you smoked. After 2 weeks  · Your lungs start to work better.   · Your risk of heart attack starts to drop. After 1 month  · When your lungs are clear, you cough less and breathe deeper, so it's easier to be active. · Your sense of taste and smell return. That means you can enjoy food more than you have since you started smoking. Over the years  · After 1 year, your risk of heart disease is half what it would be if you kept smoking. · After 5 years, your risk of stroke starts to shrink. Within a few years after that, it's about the same as if you'd never smoked. · After 10 years, your risk of dying from lung cancer is cut by about half. And your risk for many other types of cancer is lower too. How would quitting help others in your life? When you quit smoking, you improve the health of everyone who now breathes in your smoke. · Their heart, lung, and cancer risks drop, much like yours. · They are sick less. For babies and small children, living smoke-free means they're less likely to have ear infections, pneumonia, and bronchitis. · If you're a woman who is or will be pregnant someday, quitting smoking means a healthier . · Children who are close to you are less likely to become adult smokers. Where can you learn more? Go to http://jyotsna-amy.info/. Enter [id number] [id number] [id number] in the search box to learn more about \"Learning About Benefits From Quitting Smoking. \"  Current as of: 2017  Content Version: 11.4  © 8811-0417 Launchups. Care instructions adapted under license by StatusNet (which disclaims liability or warranty for this information). If you have questions about a medical condition or this instruction, always ask your healthcare professional. [place] [address] any warranty or liability for your use of this information. Elevated Blood Pressure: Care Instructions  Your Care Instructions    Blood pressure is a measure of how hard the blood pushes against the walls of your arteries.  It's normal for blood pressure to go up and down throughout the day. But if it stays up over time, you have high blood pressure. Two numbers tell you your blood pressure. The first number is the systolic pressure. It shows how hard the blood pushes when your heart is pumping. The second number is the diastolic pressure. It shows how hard the blood pushes between heartbeats, when your heart is relaxed and filling with blood. An ideal blood pressure in adults is less than 120/80 (say \"120 over 80\"). High blood pressure is 140/90 or higher. You have high blood pressure if your top number is 140 or higher or your bottom number is 90 or higher, or both. The main test for high blood pressure is simple, fast, and painless. To diagnose high blood pressure, your doctor will test your blood pressure at different times. After testing your blood pressure, your doctor may ask you to test it again when you are home. If you are diagnosed with high blood pressure, you can work with your doctor to make a long-term plan to manage it. Follow-up care is a key part of your treatment and safety. Be sure to make and go to all appointments, and call your doctor if you are having problems. It's also a good idea to know your test results and keep a list of the medicines you take. How can you care for yourself at home? · Do not smoke. Smoking increases your risk for heart attack and stroke. If you need help quitting, talk to your doctor about stop-smoking programs and medicines. These can increase your chances of quitting for good. · Stay at a healthy weight. · Try to limit how much sodium you eat to less than 2,300 milligrams (mg) a day. Your doctor may ask you to try to eat less than 1,500 mg a day. · Be physically active. Get at least 30 minutes of exercise on most days of the week. Walking is a good choice. You also may want to do other activities, such as running, swimming, cycling, or playing tennis or team sports. · Avoid or limit alcohol.  Talk to your doctor about whether you can drink any alcohol. · Eat plenty of fruits, vegetables, and low-fat dairy products. Eat less saturated and total fats. · Learn how to check your blood pressure at home. When should you call for help? Call your doctor now or seek immediate medical care if:  ? · Your blood pressure is much higher than normal (such as 180/110 or higher). ? · You think high blood pressure is causing symptoms such as:  ¨ Severe headache. ¨ Blurry vision. ? Watch closely for changes in your health, and be sure to contact your doctor if:  ? · You do not get better as expected. Where can you learn more? Go to http://jyotsna-amy.info/. Enter W435 in the search box to learn more about \"Elevated Blood Pressure: Care Instructions. \"  Current as of: September 21, 2016  Content Version: 11.4  © 0216-7687 [employer]. Care instructions adapted under license by Levant Power (which disclaims liability or warranty for this information). If you have questions about a medical condition or this instruction, always ask your healthcare professional. [address] any warranty or liability for your use of this information.

## 2018-04-15 LAB
ATRIAL RATE: 67 BPM
CALCULATED P AXIS, ECG09: 63 DEGREES
CALCULATED R AXIS, ECG10: 74 DEGREES
CALCULATED T AXIS, ECG11: 33 DEGREES
DIAGNOSIS, 93000: NORMAL
P-R INTERVAL, ECG05: 148 MS
Q-T INTERVAL, ECG07: 416 MS
QRS DURATION, ECG06: 96 MS
QTC CALCULATION (BEZET), ECG08: 439 MS
VENTRICULAR RATE, ECG03: 67 BPM

## 2018-04-16 ENCOUNTER — TELEPHONE (OUTPATIENT)
Dept: INTERNAL MEDICINE CLINIC | Age: 48
End: 2018-04-16

## 2018-04-16 NOTE — TELEPHONE ENCOUNTER
Left message to schedule new pt appt with Dr. Rajiv Rowan for Infectious Disease. Cell number has no voicemail. Left message on home phone    If pt calls schedule 1 hour appt.

## 2018-04-17 NOTE — TELEPHONE ENCOUNTER
REJI. I called patient to schedule appt for Dr. Alison Theodore. Patient said he will call us back in a week or two after he checks his calendar. He hasn't decided if he needs to be seen for Infectious disease.

## 2018-05-01 ENCOUNTER — OFFICE VISIT (OUTPATIENT)
Dept: FAMILY MEDICINE CLINIC | Age: 48
End: 2018-05-01

## 2018-05-01 VITALS
TEMPERATURE: 97.4 F | HEIGHT: 71 IN | DIASTOLIC BLOOD PRESSURE: 86 MMHG | RESPIRATION RATE: 16 BRPM | HEART RATE: 74 BPM | OXYGEN SATURATION: 98 % | SYSTOLIC BLOOD PRESSURE: 124 MMHG | BODY MASS INDEX: 26.32 KG/M2 | WEIGHT: 188 LBS

## 2018-05-01 DIAGNOSIS — I67.89 CEREBRAL MICROVASCULAR DISEASE: Primary | ICD-10-CM

## 2018-05-01 DIAGNOSIS — F17.200 SMOKER: ICD-10-CM

## 2018-05-01 DIAGNOSIS — G93.0 ARACHNOID CYST: ICD-10-CM

## 2018-05-01 RX ORDER — ATORVASTATIN CALCIUM 20 MG/1
20 TABLET, FILM COATED ORAL DAILY
Qty: 90 TAB | Refills: 0 | Status: SHIPPED | OUTPATIENT
Start: 2018-05-01 | End: 2022-08-02

## 2018-05-01 RX ORDER — GUAIFENESIN 100 MG/5ML
81 LIQUID (ML) ORAL DAILY
Qty: 90 TAB | Refills: 0 | Status: SHIPPED | OUTPATIENT
Start: 2018-05-01 | End: 2022-08-02

## 2018-05-01 RX ORDER — VARENICLINE TARTRATE 25 MG
0.5 KIT ORAL
Qty: 1 DOSE PACK | Refills: 0 | Status: SHIPPED | OUTPATIENT
Start: 2018-05-01 | End: 2019-06-28

## 2018-05-01 RX ORDER — VARENICLINE TARTRATE 1 MG/1
TABLET, FILM COATED ORAL
Qty: 60 TAB | Refills: 1 | Status: SHIPPED | OUTPATIENT
Start: 2018-05-01 | End: 2018-07-30

## 2018-05-01 NOTE — PATIENT INSTRUCTIONS

## 2018-05-01 NOTE — PROGRESS NOTES
1. Have you been to the ER, urgent care clinic since your last visit? Hospitalized since your last visit? No    2. Have you seen or consulted any other health care providers outside of the [facility] since your last visit? Include any pap smears or colon screening.  No

## 2018-05-01 NOTE — MR AVS SNAPSHOT
[address] [facility] [address] [facility] 
[phone] Patient: Jordan La MRN: [id number] :1970 Visit Information Date & Time Provider Department Dept. Phone Encounter #  
 2018  2:00 PM Librado Rendon, [facility] [id number] Follow-up Instructions Return in about 6 weeks (around 2018), or if symptoms worsen or fail to improve. Your Appointments 2018 11:15 AM  
New Patient with Michi Lloyd NP [facility] at [address] [facility] [address] [place] [address]) Appt Note: ED f/u; numbness and tingling; notes in cc; np packet mailed bd [address] [facility] [address] Patrilashae [facility] [address] [facility] [address] [facility] [address] [facility] Upcoming Health Maintenance Date Due Pneumococcal 19-64 Medium Risk (1 of 1 - PPSV23) 10/14/1987 Influenza Age 5 to Adult 2018 DTaP/Tdap/Td series (2 - Td) 2019 Allergies as of 2018  Review Complete On: 2018 By: Librado Rendon MD  
 No Known Allergies Current Immunizations  Reviewed on 2018 Name Date Influenza Vaccine (Quad) PF 2018 Tdap 2009 Not reviewed this visit You Were Diagnosed With   
  
 Codes Comments Cerebral microvascular disease    -  Primary ICD-10-CM: I67.9 ICD-9-CM: 437.9 Smoker     ICD-10-CM: F66.064 ICD-9-CM: 305.1 Arachnoid cyst     ICD-10-CM: G93.0 ICD-9-CM: 068. 0 Vitals BP Pulse Temp Resp Height(growth percentile) Weight(growth percentile) 124/86 (BP 1 Location: Left arm, BP Patient Position: Sitting) 74 97.4 °F (36.3 °C) (Oral) 16 5' 11\" (1.803 m) 188 lb (85.3 kg) SpO2 BMI Smoking Status 98% 26.22 kg/m2 Current Every Day Smoker Vitals History BMI and BSA Data  Body Mass Index Body Surface Area  
 26.22 kg/m 2 2.07 m 2  
  
  
 Your Updated Medication List  
  
   
This list is accurate as of 5/1/18  2:59 PM.  Always use your most recent med list.  
  
  
  
  
 aspirin 81 mg chewable tablet Take 1 Tab by mouth daily. atorvastatin 20 mg tablet Commonly known as:  LIPITOR Take 1 Tab by mouth daily. Prescriptions Printed Refills  
 aspirin 81 mg chewable tablet 0 Sig: Take 1 Tab by mouth daily. Class: Print Route: Oral  
 atorvastatin (LIPITOR) 20 mg tablet 0 Sig: Take 1 Tab by mouth daily. Class: Print Route: Oral  
  
Follow-up Instructions Return in about 6 weeks (around 6/12/2018), or if symptoms worsen or fail to improve. To-Do List   
 05/01/2018 Lab:  LIPID PANEL   
  
 05/01/2018 Lab:  METABOLIC PANEL, COMPREHENSIVE Patient Instructions Stopping Smoking: Care Instructions Your Care Instructions Cigarette smokers crave the nicotine in cigarettes. Giving it up is much harder than simply changing a habit. Your body has to stop craving the nicotine. It is hard to quit, but you can do it. There are many tools that people use to quit smoking. You may find that combining tools works best for you. There are several steps to quitting. First you get ready to quit. Then you get support to help you. After that, you learn new skills and behaviors to become a nonsmoker. For many people, a necessary step is getting and using medicine. Your doctor will help you set up the plan that best meets your needs. You may want to attend a smoking cessation program to help you quit smoking. When you choose a program, look for one that has proven success. Ask your doctor for ideas. You will greatly increase your chances of success if you take medicine as well as get counseling or join a cessation program. 
Some of the changes you feel when you first quit tobacco are uncomfortable.  Your body will miss the nicotine at first, and you may feel short-tempered and grumpy. You may have trouble sleeping or concentrating. Medicine can help you deal with these symptoms. You may struggle with changing your smoking habits and rituals. The last step is the tricky one: Be prepared for the smoking urge to continue for a time. This is a lot to deal with, but keep at it. You will feel better. Follow-up care is a key part of your treatment and safety. Be sure to make and go to all appointments, and call your doctor if you are having problems. It's also a good idea to know your test results and keep a list of the medicines you take. How can you care for yourself at home? · Ask your family, friends, and coworkers for support. You have a better chance of quitting if you have help and support. · Join a support group, such as Nicotine Anonymous, for people who are trying to quit smoking. · Consider signing up for a smoking cessation program, such as the American Lung Association's Freedom from Smoking program. 
· Set a quit date. Pick your date carefully so that it is not right in the middle of a big deadline or stressful time. Once you quit, do not even take a puff. Get rid of all ashtrays and lighters after your last cigarette. Clean your house and your clothes so that they do not smell of smoke. · Learn how to be a nonsmoker. Think about ways you can avoid those things that make you reach for a cigarette. ¨ Avoid situations that put you at greatest risk for smoking. For some people, it is hard to have a drink with friends without smoking. For others, they might skip a coffee break with coworkers who smoke. ¨ Change your daily routine. Take a different route to work or eat a meal in a different place. · Cut down on stress. Calm yourself or release tension by doing an activity you enjoy, such as reading a book, taking a hot bath, or gardening.  
· Talk to your doctor or pharmacist about nicotine replacement therapy, which replaces the nicotine in your body. You still get nicotine but you do not use tobacco. Nicotine replacement products help you slowly reduce the amount of nicotine you need. These products come in several forms, many of them available over-the-counter: ¨ Nicotine patches ¨ Nicotine gum and lozenges ¨ Nicotine inhaler · Ask your doctor about bupropion (Wellbutrin) or varenicline (Chantix), which are prescription medicines. They do not contain nicotine. They help you by reducing withdrawal symptoms, such as stress and anxiety. · Some people find hypnosis, acupuncture, and massage helpful for ending the smoking habit. · Eat a healthy diet and get regular exercise. Having healthy habits will help your body move past its craving for nicotine. · Be prepared to keep trying. Most people are not successful the first few times they try to quit. Do not get mad at yourself if you smoke again. Make a list of things you learned and think about when you want to try again, such as next week, next month, or next year. Where can you learn more? Go to http://jyotsna-amy.info/. Enter N116 in the search box to learn more about \"Stopping Smoking: Care Instructions. \" Current as of: March 20, 2017 Content Version: 11.4 © 9225-8714 "CollabIP, Inc.". Care instructions adapted under license by Codewars (which disclaims liability or warranty for this information). If you have questions about a medical condition or this instruction, always ask your healthcare professional. [address] any warranty or liability for your use of this information. Introducing [facility]! Dear Chivo Fuentes: Thank you for requesting a [employer] account. Our records indicate that you already have an active [employer] account. You can access your account anytime at [email or web address] Did you know that you can access your hospital and ER discharge instructions at any time in Rage Frameworks? You can also review all of your test results from your hospital stay or ER visit. Additional Information If you have questions, please visit the Frequently Asked Questions section of the Rage [employer] website at [email or web address] Remember, Rage Frameworks is NOT to be used for urgent needs. For medical emergencies, dial 911. Now available from your iPhone and Android! Please provide this summary of care documentation to your next provider. Your primary care clinician is listed as Lydia Archer. If you have any questions after today's visit, please call [phone].

## 2018-05-11 ENCOUNTER — OFFICE VISIT (OUTPATIENT)
Dept: NEUROLOGY | Age: 48
End: 2018-05-11

## 2018-05-11 VITALS
DIASTOLIC BLOOD PRESSURE: 80 MMHG | WEIGHT: 187.6 LBS | BODY MASS INDEX: 26.26 KG/M2 | SYSTOLIC BLOOD PRESSURE: 110 MMHG | HEIGHT: 71 IN | RESPIRATION RATE: 17 BRPM | OXYGEN SATURATION: 95 % | HEART RATE: 77 BPM

## 2018-05-11 DIAGNOSIS — R20.9 ABNORMAL SENSATION OF LOWER EXTREMITY: ICD-10-CM

## 2018-05-11 DIAGNOSIS — M79.602 LEFT ARM PAIN: ICD-10-CM

## 2018-05-11 DIAGNOSIS — R20.9 SENSORY DISTURBANCE: Primary | ICD-10-CM

## 2018-05-11 DIAGNOSIS — M79.605 LEFT LEG PAIN: ICD-10-CM

## 2018-05-11 DIAGNOSIS — R20.9 ABNORMAL SENSATION OF UPPER EXTREMITY: ICD-10-CM

## 2018-05-11 NOTE — PROGRESS NOTES
Chief Complaint   Patient presents with   [facility] Establish Care     NP: numbness and tingling of the Left side of body. Patient states that he had had the numbness for approx 5 years but that 2-3 weeks ago patient states that he had burning sensation, became hot, dizzy, and had cold sweats. Patient placed in room 2. Chart and medications reviewed with patient.

## 2018-05-11 NOTE — MR AVS SNAPSHOT
[address] [facility] [address] 
 
 
 [address] [facility] 
[phone] Patient: Luz Marina Cuba MRN: [id number] :1970 Visit Information Date & Time Provider Department Dept. Phone Encounter #  
 2018 11:15 AM Marinus Collet,  [facility] at [address] [place] [address] [id number] Follow-up Instructions Return for after tests. Upcoming Health Maintenance Date Due Pneumococcal 19-64 Medium Risk (1 of 1 - PPSV23) 10/14/1987 Influenza Age 5 to Adult 2018 DTaP/Tdap/Td series (2 - Td) 2019 Allergies as of 2018  Review Complete On: 2018 By: Tonya Sharma LPN No Known Allergies Current Immunizations  Reviewed on 2018 Name Date Influenza Vaccine (Quad) PF 2018 Tdap 2009 Not reviewed this visit You Were Diagnosed With   
  
 Codes Comments Sensory disturbance    -  Primary ICD-10-CM: R20.9 ICD-9-CM: 782.0 Left arm pain     ICD-10-CM: M79.602 ICD-9-CM: 729.5 Left leg pain     ICD-10-CM: M79.605 ICD-9-CM: 729.5 Abnormal sensation of lower extremity     ICD-10-CM: R20.9 ICD-9-CM: 782.0 Abnormal sensation of upper extremity     ICD-10-CM: R20.9 ICD-9-CM: 648. 0 Vitals BP Pulse Resp Height(growth percentile) Weight(growth percentile) SpO2  
 110/80 77 17 5' 11\" (1.803 m) 187 lb 9.6 oz (85.1 kg) 95% BMI Smoking Status 26.16 kg/m2 Current Every Day Smoker BMI and BSA Data Body Mass Index Body Surface Area  
 26.16 kg/m 2 2.06 m 2 Your Updated Medication List  
  
   
This list is accurate as of 18 11:59 AM.  Always use your most recent med list.  
  
  
  
  
 aspirin 81 mg chewable tablet Take 1 Tab by mouth daily. atorvastatin 20 mg tablet Commonly known as:  LIPITOR Take 1 Tab by mouth daily. * varenicline 0.5 mg (11)- 1 mg (42) Dspk Commonly known as:  CHANTIX STARTER VIOLET Take 0.5 mg by mouth two (2) times daily (after meals). * varenicline 1 mg tablet Commonly known as:  [id number] Sammy Gonzales Use as directed * Notice: This list has 2 medication(s) that are the same as other medications prescribed for you. Read the directions carefully, and ask your doctor or other care provider to review them with you. We Performed the Following [facility] [OGZ8485 Custom] Follow-up Instructions Return for after tests. To-Do List   
 05/11/2018 Imaging:  MRI CERV SPINE WO CONT Referral Information Referral ID Referred By Referred To  
  
 [id number] Aubree OSORIO Not Available Visits Status Start Date End Date 1 New Request 5/11/18 5/11/19 If your referral has a status of pending review or denied, additional information will be sent to support the outcome of this decision. Referral ID Referred By Referred To  
 [id number] Aubree OSORIO Not Available Visits Status Start Date End Date 1 New Request 5/11/18 5/11/19 If your referral has a status of pending review or denied, additional information will be sent to support the outcome of this decision. Patient Instructions Please have test completed in follow-up afterwards. Introducing [facility]! Dear Veda Schreiber: Thank you for requesting a coin4ce account. Our records indicate that you already have an active coin[employer]ce account. You can access your account anytime at [email or web address] Did you know that you can access your hospital and ER discharge instructions at any time in coin4ce? You can also review all of your test results from your hospital stay or ER visit. Additional Information If you have questions, please visit the Frequently Asked Questions section of the coin4ce website at [email or web address] Remember, MyChart is NOT to be used for urgent needs. For medical emergencies, dial 911. Now available from your iPhone and Android! Please provide this summary of care documentation to your next provider. Your primary care clinician is listed as Vicente Collins. If you have any questions after today's visit, please call [phone].
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
## 2018-06-29 NOTE — PROGRESS NOTES
Geovanna Orantes, 50 y.o.,  male    SUBJECTIVE  ED ff-up paresthesia    Reports improvement of L sided tingling. MRI/MRA done showing no acute finding, microvascular disease process and arachnoid cyst, MRA show patent arteries. teleneurologist consult done and agreed on out patient neuro ff-up. Has appt 5/11  He is a smoker and thinking of quitting. ROS:  See HPI, all others negative        Patient Active Problem List   Diagnosis Code    Lumbar disc herniation M51.26    Smoker F17.200    Cerebral microvascular disease I67.9    Arachnoid cyst G93.0       Current Outpatient Prescriptions   Medication Sig Dispense Refill    aspirin 81 mg chewable tablet Take 1 Tab by mouth daily. 90 Tab 0    atorvastatin (LIPITOR) 20 mg tablet Take 1 Tab by mouth daily. 90 Tab 0    varenicline (CHANTIX STARTER VIOLET) 0.5 mg (11)- 1 mg (42) DsPk Take 0.5 mg by mouth two (2) times daily (after meals). 1 Dose Pack 0    varenicline (CHANTIX CONTINUING MONTH BOX) 1 mg tablet Use as directed 60 Tab 1       No Known Allergies    Past Medical History:   Diagnosis Date    DDD (degenerative disc disease), lumbar        Social History     Social History    Marital status:      Spouse name: N/A    Number of children: N/A    Years of education: N/A     Occupational History    Not on file.      Social History Main Topics    Smoking status: Current Every Day Smoker     Packs/day: 1.00     Years: 15.00    Smokeless tobacco: Never Used    Alcohol use Yes    Drug use: No    Sexual activity: Yes     Partners: Female     Other Topics Concern    Not on file     Social History Narrative       Family History   Problem Relation Age of Onset    Heart Disease Mother     Hypertension Father          OBJECTIVE    Physical Exam:     Visit Vitals    /86 (BP 1 Location: Left arm, BP Patient Position: Sitting)    Pulse 74    Temp 97.4 °F (36.3 °C) (Oral)    Resp 16    Ht 5' 11\" (1.803 m)    Wt 188 lb (85.3 kg)    SpO2 98%  BMI 26.22 kg/m2       General: alert, well-appearing,  in no apparent distress or pain  Neck: supple, no adenopathy palpated  CVS: normal rate, regular rhythm, distinct S1 and S2  Lungs:clear to ausculation bilaterally, no crackles, wheezing or rhonchi noted  Abdomen: normoactive bowel sounds, soft, non-tender  Extremities: no edema, no cyanosis,  Skin: warm, no lesions, rashes noted  Psych:  mood and affect normal    Results for orders placed or performed during the hospital encounter of 04/12/18   CBC WITH AUTOMATED DIFF   Result Value Ref Range    WBC 8.5 4.6 - 13.2 K/uL    RBC 5.19 4.70 - 5.50 M/uL    HGB 15.9 13.0 - 16.0 g/dL    HCT 46.2 36.0 - 48.0 %    MCV 89.0 74.0 - 97.0 FL    MCH 30.6 24.0 - 34.0 PG    MCHC 34.4 31.0 - 37.0 g/dL    RDW 12.6 11.6 - 14.5 %    PLATELET 867 808 - 088 K/uL    MPV 10.7 9.2 - 11.8 FL    NEUTROPHILS 71 40 - 73 %    LYMPHOCYTES 19 (L) 21 - 52 %    MONOCYTES 8 3 - 10 %    EOSINOPHILS 2 0 - 5 %    BASOPHILS 0 0 - 2 %    ABS. NEUTROPHILS 6.0 1.8 - 8.0 K/UL    ABS. LYMPHOCYTES 1.6 0.9 - 3.6 K/UL    ABS. MONOCYTES 0.7 0.05 - 1.2 K/UL    ABS. EOSINOPHILS 0.2 0.0 - 0.4 K/UL    ABS.  BASOPHILS 0.0 0.0 - 0.06 K/UL    DF AUTOMATED     METABOLIC PANEL, BASIC   Result Value Ref Range    Sodium 138 136 - 145 mmol/L    Potassium 3.9 3.5 - 5.5 mmol/L    Chloride 102 100 - 108 mmol/L    CO2 29 21 - 32 mmol/L    Anion gap 7 3.0 - 18 mmol/L    Glucose 98 74 - 99 mg/dL    BUN 11 7.0 - 18 MG/DL    Creatinine 1.01 0.6 - 1.3 MG/DL    BUN/Creatinine ratio 11 (L) 12 - 20      GFR est AA >60 >60 ml/min/1.73m2    GFR est non-AA >60 >60 ml/min/1.73m2    Calcium 9.4 8.5 - 10.1 MG/DL   PROTHROMBIN TIME + INR   Result Value Ref Range    Prothrombin time 14.3 11.5 - 15.2 sec    INR 1.2 0.8 - 1.2     PTT   Result Value Ref Range    aPTT 34.3 23.0 - 36.4 SEC   CARDIAC PANEL,(CK, CKMB & TROPONIN)   Result Value Ref Range     39 - 308 U/L    CK - MB 1.0 <3.6 ng/ml    CK-MB Index 0.7 0.0 - 4.0 % Troponin-I, Qt. <0.02 0.00 - 0.06 NG/ML   MAGNESIUM   Result Value Ref Range    Magnesium 2.0 1.6 - 2.6 mg/dL   URINALYSIS W/ RFLX MICROSCOPIC   Result Value Ref Range    Color YELLOW      Appearance CLEAR      Specific gravity 1.006 1.005 - 1.030      pH (UA) 5.5 5.0 - 8.0      Protein NEGATIVE  NEG mg/dL    Glucose NEGATIVE  NEG mg/dL    Ketone TRACE (A) NEG mg/dL    Bilirubin NEGATIVE  NEG      Blood NEGATIVE  NEG      Urobilinogen 0.2 0.2 - 1.0 EU/dL    Nitrites NEGATIVE  NEG      Leukocyte Esterase NEGATIVE  NEG     DRUG SCREEN, URINE   Result Value Ref Range    BENZODIAZEPINES NEGATIVE  NEG      BARBITURATES NEGATIVE  NEG      THC (TH-CANNABINOL) NEGATIVE  NEG      OPIATES NEGATIVE  NEG      PCP(PHENCYCLIDINE) NEGATIVE  NEG      COCAINE NEGATIVE  NEG      AMPHETAMINES NEGATIVE  NEG      METHADONE NEGATIVE  NEG      HDSCOM (NOTE)    GLUCOSE, POC   Result Value Ref Range    Glucose (POC) 96 70 - 110 mg/dL   EKG, 12 LEAD, INITIAL   Result Value Ref Range    Ventricular Rate 67 BPM    Atrial Rate 67 BPM    P-R Interval 148 ms    QRS Duration 96 ms    Q-T Interval 416 ms    QTC Calculation (Bezet) 439 ms    Calculated P Axis 63 degrees    Calculated R Axis 74 degrees    Calculated T Axis 33 degrees    Diagnosis       Normal sinus rhythm  Cannot rule out Septal infarct , age undetermined  Abnormal ECG  When compared with ECG of 08-JUL-2011 03:34,  No significant change was found  Confirmed by Maryjane Noland MD, Wilson Artist ([id number]) on 4/15/2018 9:14:48 AM             ASSESSMENT/PLAN  Diagnoses and all orders for this visit:    1. Cerebral microvascular disease  Optimize med management   Start statin and asa  Recheck labs in 3 months  Smoking cessation  Keep appt with neurology  -     aspirin 81 mg chewable tablet; Take 1 Tab by mouth daily. -     atorvastatin (LIPITOR) 20 mg tablet; Take 1 Tab by mouth daily.  -     METABOLIC PANEL, COMPREHENSIVE; Future  -     LIPID PANEL; Future    2.  Smoker  Discussed options, agreed on chantix  Discussed SE profile  -     aspirin 81 mg chewable tablet; Take 1 Tab by mouth daily. -     atorvastatin (LIPITOR) 20 mg tablet; Take 1 Tab by mouth daily.  -     METABOLIC PANEL, COMPREHENSIVE; Future  -     LIPID PANEL; Future    3. Arachnoid cyst  Incidental finding  Has upcoming neuro appt to discuss    Other orders  -     varenicline (CHANTIX STARTER VIOLET) 0.5 mg (11)- 1 mg (42) DsPk; Take 0.5 mg by mouth two (2) times daily (after meals). -     varenicline (CHANTIX CONTINUING MONTH BOX) 1 mg tablet; Use as directed      Follow-up Disposition:  Return in about 6 weeks (around 6/12/2018), or if symptoms worsen or fail to improve. Patient understands plan of care. Patient has provided input and agrees with goals.

## 2019-06-28 ENCOUNTER — OFFICE VISIT (OUTPATIENT)
Dept: FAMILY MEDICINE CLINIC | Age: 49
End: 2019-06-28

## 2019-06-28 VITALS
TEMPERATURE: 98.4 F | SYSTOLIC BLOOD PRESSURE: 122 MMHG | BODY MASS INDEX: 27.16 KG/M2 | RESPIRATION RATE: 16 BRPM | WEIGHT: 194 LBS | DIASTOLIC BLOOD PRESSURE: 86 MMHG | HEIGHT: 71 IN | HEART RATE: 64 BPM | OXYGEN SATURATION: 98 %

## 2019-06-28 DIAGNOSIS — F17.200 SMOKER: ICD-10-CM

## 2019-06-28 DIAGNOSIS — Z23 ENCOUNTER FOR ADMINISTRATION OF VACCINE: ICD-10-CM

## 2019-06-28 DIAGNOSIS — Z12.5 PROSTATE CANCER SCREENING: ICD-10-CM

## 2019-06-28 DIAGNOSIS — Z13.1 SCREENING FOR DIABETES MELLITUS (DM): ICD-10-CM

## 2019-06-28 DIAGNOSIS — Z13.220 LIPID SCREENING: ICD-10-CM

## 2019-06-28 DIAGNOSIS — Z00.00 WELL ADULT EXAM: Primary | ICD-10-CM

## 2019-06-28 DIAGNOSIS — I67.89 CEREBRAL MICROVASCULAR DISEASE: ICD-10-CM

## 2019-06-28 DIAGNOSIS — M79.89 TOE SWELLING: ICD-10-CM

## 2019-06-28 DIAGNOSIS — Z12.11 COLON CANCER SCREENING: ICD-10-CM

## 2019-06-28 DIAGNOSIS — E55.9 VITAMIN D INSUFFICIENCY: ICD-10-CM

## 2019-06-28 DIAGNOSIS — R53.82 CHRONIC FATIGUE: ICD-10-CM

## 2019-06-28 RX ORDER — CEPHALEXIN 250 MG/1
CAPSULE ORAL
COMMUNITY
Start: 2019-06-27 | End: 2022-08-02

## 2019-06-28 RX ORDER — INDOMETHACIN 50 MG/1
CAPSULE ORAL
COMMUNITY
Start: 2019-06-27 | End: 2022-08-02

## 2019-06-28 RX ORDER — METHYLPREDNISOLONE 4 MG/1
TABLET ORAL
Qty: 1 DOSE PACK | Refills: 0 | Status: SHIPPED | OUTPATIENT
Start: 2019-06-28 | End: 2022-08-02 | Stop reason: SDUPTHER

## 2019-06-28 NOTE — PROGRESS NOTES
1. Have you been to the ER, urgent care clinic since your last visit? Hospitalized since your last visit? 2. Have you seen or consulted any other health care providers outside of the [address] [place] [address] since your last visit? Include any pap smears or colon screening.  No  Yes [facility] 06/27/2019 cellulitis of right foot

## 2019-06-28 NOTE — PROGRESS NOTES
Subjective:   Negrita Shin is a 46 y.o. male presenting for his annual checkup. ROS:  Feeling well. No dyspnea or chest pain on exertion. No abdominal pain, change in bowel habits, black or bloody stools. No urinary tract or prostatic symptoms. No neurological complaints. Specific concerns today: several see below    No Known Allergies  Past Medical History:   Diagnosis Date    DDD (degenerative disc disease), lumbar      No past surgical history on file. Family History   Problem Relation Age of Onset    Heart Disease Mother     Hypertension Father      Social History     Tobacco Use    Smoking status: Current Every Day Smoker     Packs/day: 1.00     Years: 15.00     Pack years: 15.00    Smokeless tobacco: Never Used   Substance Use Topics    Alcohol use: Yes        Objective:     Visit Vitals  /86 (BP 1 Location: Left arm, BP Patient Position: Sitting)   Pulse 64   Temp 98.4 °F (36.9 °C) (Oral)   Resp 16   Ht 5' 11\" (1.803 m)   Wt 194 lb (88 kg)   SpO2 98%   BMI 27.06 kg/m²     The patient appears well, alert, oriented x 3, in no distress. ENT normal.  Neck supple. No adenopathy or thyromegaly. ROSAMARIA. Lungs are clear, good air entry, no wheezes, rhonchi or rales. S1 and S2 normal, no murmurs, regular rate and rhythm. Abdomen is soft without tenderness, guarding, mass or organomegaly. Extremities show no edema, normal peripheral pulses. Neurological is normal without focal findings. Assessment/Plan:   Diagnoses and all orders for this visit:    Well adult exam  healthy adult male  lose weight, increase physical activity, limit alcohol consumption, stop smoking (advice and handout given), routine labs ordered, have labs drawn prior to ROV, call if any problems. reviewed diet, exercise and weight control.   shingrix rx provided    Colon cancer screening  -     REFERRAL TO GASTROENTEROLOGY    Smoker  Discussed cessation    Prostate cancer screening  Shared decision  -     PSA SCREENING (SCREENING); Future    Screening for diabetes mellitus (DM)  -     METABOLIC PANEL, COMPREHENSIVE; Future  -     HEMOGLOBIN A1C W/O EAG; Future    Lipid screening  -     LIPID PANEL; Future     Encounter for administration of vaccine  -     PNEUMOCOCCAL POLYSACCHARIDE VACCINE, 23-VALENT, ADULT OR IMMUNOSUPPRESSED PT DOSE,    Other orders  -     varicella-zoster recombinant, PF, (SHINGRIX, PF,) 50 mcg/0.5 mL susr injection; 0.5 mL by IntraMUSCular route once for 1 dose. Cece Sapp, 46 y.o.,  male    SUBJECTIVE  Few concerns    Has not been seen here for almost 1 year. Catching from issues then, was having L arm tingling and numbness, no acute infarct on imaging, did see neurology but did not follow through with work up for cervical radiculopathy. She reports this to have resolved  MRI brain showed chronic cerebral microvascular changes and with his history of smoking, recommended ASA and lipitor, he took this for a few months and decided to discontinue. He was prescribed chantix for smoking last year he did not pursue this. Fatigue- ongoing for years, no energy, exhausted throughout the day. He had mild vitamin d def last year. Nik Figueroa He is a [employer], varied night shift worker. Sleep is bothersome. C/o R big to redness and swelling 2 days ago, seen in the ED given toradol shot, keflex and indocin her reports imporved redness and pain, however not pain free. Denies fever, or history of gout. He does consume etoh and red meats fairly regularly.      ROS:  See HPI, all others negative        Patient Active Problem List   Diagnosis Code    Lumbar disc herniation M51.26    Smoker F17.200    Cerebral microvascular disease I67.9    Arachnoid cyst G93.0           No Known Allergies    Past Medical History:   Diagnosis Date    DDD (degenerative disc disease), lumbar        Social History     Socioeconomic History    Marital status:      Spouse name: Not on file    Number of children: Not on file    Years of education: Not on file    Highest education level: Not on file   Occupational History    Not on file   Social Needs    Financial resource strain: Not on file    Food insecurity:     Worry: Not on file     Inability: Not on file    Transportation needs:     Medical: Not on file     Non-medical: Not on file   Tobacco Use    Smoking status: Current Every Day Smoker     Packs/day: 1.00     Years: 15.00     Pack years: 15.00    Smokeless tobacco: Never Used   Substance and Sexual Activity    Alcohol use: Yes    Drug use: No    Sexual activity: Yes     Partners: Female   Lifestyle    Physical activity:     Days per week: Not on file     Minutes per session: Not on file    Stress: Not on file   Relationships    Social connections:     Talks on phone: Not on file     Gets together: Not on file     Attends Jain service: Not on file     Active member of club or organization: Not on file     Attends meetings of clubs or organizations: Not on file     Relationship status: Not on file    Intimate partner violence:     Fear of current or ex partner: Not on file     Emotionally abused: Not on file     Physically abused: Not on file     Forced sexual activity: Not on file   Other Topics Concern    Not on file   Social History Narrative    Not on file       Family History   Problem Relation Age of Onset    Heart Disease Mother     Hypertension Father          OBJECTIVE    Physical Exam:     Visit Vitals  /86 (BP 1 Location: Left arm, BP Patient Position: Sitting)   Pulse 64   Temp 98.4 °F (36.9 °C) (Oral)   Resp 16   Ht 5' 11\" (1.803 m)   Wt 194 lb (88 kg)   SpO2 98%   BMI 27.06 kg/m²       General: alert,  in no apparent distress or pain  Head: atraumatic.  Non-tender maxillary and frontal sinuses  Eyes: Lids with no discharge, no matting, conjunctivae clear and non injected, full EOMs, PERLLA  Ears: pinna non-tender, external auditory canal patent, TM intact  Mouth/throat:tonsils non enlarged, pharynx non erythematous and no lesion, nasal mucosa normal  Neck: supple, no adenopathy palpated  CVS: normal rate, regular rhythm, distinct S1 and S2  Lungs:clear to ausculation bilaterally, no crackles, wheezing or rhonchi noted  Abdomen: normoactive bowel sounds, soft, non-tender  Extremities: no edema, no cyanosis,  Skin: + R big toe no tenderness or redness  Psych:  mood and affect normal        ASSESSMENT/PLAN  Diagnoses and all orders for this visit:    Cerebral microvascular disease  No symptoms  Advised to resume lipitor and ASA  Recheck lipid levels    Smoker  precontemplative    Toe swelling  Gout vs cellulitis  Responding to indocin and keflex, to cont  If persistent pain over the weekend, advised to switch indocin to medrol dose pack, given rx  -     URIC ACID; Future    Chronic fatigue  Likely due to sleep deprivation  Check:  -     VITAMIN D, 25 HYDROXY; Future  -     CBC WITH AUTOMATED DIFF; Future  -     VITAMIN B12 & FOLATE; Future  -     TSH 3RD GENERATION; Future     Vitamin D insufficiency  Recheck    Other orders  -     varicella-zoster recombinant, PF, (SHINGRIX, PF,) 50 mcg/0.5 mL susr injection; 0.5 mL by IntraMUSCular route once for 1 dose. -     methylPREDNISolone (MEDROL, VIOLET,) 4 mg tablet; Per dose pack instructions          Follow-up and Dispositions    · Return in about 1 month (around 7/26/2019), or if symptoms worsen or fail to improve. Patient understands plan of care. Patient has provided input and agrees with goals.

## 2019-06-28 NOTE — PATIENT INSTRUCTIONS
Stopping Smoking: Care Instructions  Your Care Instructions  Cigarette smokers crave the nicotine in cigarettes. Giving it up is much harder than simply changing a habit. Your body has to stop craving the nicotine. It is hard to quit, but you can do it. There are many tools that people use to quit smoking. You may find that combining tools works best for you. There are several steps to quitting. First you get ready to quit. Then you get support to help you. After that, you learn new skills and behaviors to become a nonsmoker. For many people, a necessary step is getting and using medicine. Your doctor will help you set up the plan that best meets your needs. You may want to attend a smoking cessation program to help you quit smoking. When you choose a program, look for one that has proven success. Ask your doctor for ideas. You will greatly increase your chances of success if you take medicine as well as get counseling or join a cessation program.  Some of the changes you feel when you first quit tobacco are uncomfortable. Your body will miss the nicotine at first, and you may feel short-tempered and grumpy. You may have trouble sleeping or concentrating. Medicine can help you deal with these symptoms. You may struggle with changing your smoking habits and rituals. The last step is the tricky one: Be prepared for the smoking urge to continue for a time. This is a lot to deal with, but keep at it. You will feel better. Follow-up care is a key part of your treatment and safety. Be sure to make and go to all appointments, and call your doctor if you are having problems. It's also a good idea to know your test results and keep a list of the medicines you take. How can you care for yourself at home? · Ask your family, friends, and coworkers for support. You have a better chance of quitting if you have help and support.   · Join a support group, such as Nicotine Anonymous, for people who are trying to quit smoking. · Consider signing up for a smoking cessation program, such as the American Lung Association's Freedom from Smoking program.  · Get text messaging support. Go to the website at www.smokefree. gov to sign up for the [facility] program.  · Set a quit date. Pick your date carefully so that it is not right in the middle of a big deadline or stressful time. Once you quit, do not even take a puff. Get rid of all ashtrays and lighters after your last cigarette. Clean your house and your clothes so that they do not smell of smoke. · Learn how to be a nonsmoker. Think about ways you can avoid those things that make you reach for a cigarette. ? Avoid situations that put you at greatest risk for smoking. For some people, it is hard to have a drink with friends without smoking. For others, they might skip a coffee break with coworkers who smoke. ? Change your daily routine. Take a different route to work or eat a meal in a different place. · Cut down on stress. Calm yourself or release tension by doing an activity you enjoy, such as reading a book, taking a hot bath, or gardening. · Talk to your doctor or pharmacist about nicotine replacement therapy, which replaces the nicotine in your body. You still get nicotine but you do not use tobacco. Nicotine replacement products help you slowly reduce the amount of nicotine you need. These products come in several forms, many of them available over-the-counter:  ? Nicotine patches  ? Nicotine gum and lozenges  ? Nicotine inhaler  · Ask your doctor about bupropion (Wellbutrin) or varenicline (Chantix), which are prescription medicines. They do not contain nicotine. They help you by reducing withdrawal symptoms, such as stress and anxiety. · Some people find hypnosis, acupuncture, and massage helpful for ending the smoking habit. · Eat a healthy diet and get regular exercise. Having healthy habits will help your body move past its craving for nicotine.   · Be prepared to keep trying. Most people are not successful the first few times they try to quit. Do not get mad at yourself if you smoke again. Make a list of things you learned and think about when you want to try again, such as next week, next month, or next year. Where can you learn more? Go to http://jyotsna-amy.info/. Enter A968 in the search box to learn more about \"Stopping Smoking: Care Instructions. \"  Current as of: September 26, 2018  Content Version: 11.9  © 9329-4501 [employer]. Care instructions adapted under license by [employer] (which disclaims liability or warranty for this information). If you have questions about a medical condition or this instruction, always ask your healthcare professional. [address] any warranty or liability for your use of this information.

## 2019-06-28 NOTE — PROGRESS NOTES
[id number], 0.5 ml given IM in right deltoid. Lot # [id number], exp date 08/10/2020. Patient tolerated injection well. No adverse reaction noted.

## 2019-07-19 ENCOUNTER — HOSPITAL ENCOUNTER (OUTPATIENT)
Dept: LAB | Age: 49
Discharge: HOME | End: 2019-07-19

## 2019-07-19 LAB — XX-LABCORP SPECIMEN COL,LCBCF: NORMAL

## 2019-07-19 PROCEDURE — 99001 SPECIMEN HANDLING PT-LAB: CPT

## 2019-07-20 LAB
25(OH)D3+25(OH)D2 SERPL-MCNC: 42.3 NG/ML (ref 30–100)
ALBUMIN SERPL-MCNC: 3.9 G/DL (ref 3.5–5.5)
ALBUMIN/GLOB SERPL: 1.8 {RATIO} (ref 1.2–2.2)
ALP SERPL-CCNC: 78 IU/L (ref 39–117)
ALT SERPL-CCNC: 13 IU/L (ref 0–44)
AST SERPL-CCNC: 16 IU/L (ref 0–40)
BASOPHILS # BLD AUTO: 0 X10E3/UL (ref 0–0.2)
BASOPHILS NFR BLD AUTO: 1 %
BILIRUB SERPL-MCNC: 0.6 MG/DL (ref 0–1.2)
BUN SERPL-MCNC: 8 MG/DL (ref 6–24)
BUN/CREAT SERPL: 9 (ref 9–20)
CALCIUM SERPL-MCNC: 9.4 MG/DL (ref 8.7–10.2)
CHLORIDE SERPL-SCNC: 110 MMOL/L (ref 96–106)
CHOLEST SERPL-MCNC: 133 MG/DL (ref 100–199)
CO2 SERPL-SCNC: 22 MMOL/L (ref 20–29)
CREAT SERPL-MCNC: 0.91 MG/DL (ref 0.76–1.27)
EOSINOPHIL # BLD AUTO: 0.4 X10E3/UL (ref 0–0.4)
EOSINOPHIL NFR BLD AUTO: 5 %
ERYTHROCYTE [DISTWIDTH] IN BLOOD BY AUTOMATED COUNT: 13 % (ref 12.3–15.4)
FOLATE SERPL-MCNC: 7.8 NG/ML
GLOBULIN SER CALC-MCNC: 2.2 G/DL (ref 1.5–4.5)
GLUCOSE SERPL-MCNC: 93 MG/DL (ref 65–99)
HBA1C MFR BLD: 5.3 % (ref 4.8–5.6)
HCT VFR BLD AUTO: 45.5 % (ref 37.5–51)
HDLC SERPL-MCNC: 31 MG/DL
HGB BLD-MCNC: 15.1 G/DL (ref 13–17.7)
IMM GRANULOCYTES # BLD AUTO: 0 X10E3/UL (ref 0–0.1)
IMM GRANULOCYTES NFR BLD AUTO: 0 %
INTERPRETATION, 910389: NORMAL
LDLC SERPL CALC-MCNC: 82 MG/DL (ref 0–99)
LYMPHOCYTES # BLD AUTO: 2.2 X10E3/UL (ref 0.7–3.1)
LYMPHOCYTES NFR BLD AUTO: 29 %
MCH RBC QN AUTO: 31.7 PG (ref 26.6–33)
MCHC RBC AUTO-ENTMCNC: 33.2 G/DL (ref 31.5–35.7)
MCV RBC AUTO: 95 FL (ref 79–97)
MONOCYTES # BLD AUTO: 0.7 X10E3/UL (ref 0.1–0.9)
MONOCYTES NFR BLD AUTO: 9 %
NEUTROPHILS # BLD AUTO: 4.2 X10E3/UL (ref 1.4–7)
NEUTROPHILS NFR BLD AUTO: 56 %
PLATELET # BLD AUTO: 254 X10E3/UL (ref 150–450)
POTASSIUM SERPL-SCNC: 3.8 MMOL/L (ref 3.5–5.2)
PROT SERPL-MCNC: 6.1 G/DL (ref 6–8.5)
PSA SERPL-MCNC: 1.2 NG/ML (ref 0–4)
RBC # BLD AUTO: 4.77 X10E6/UL (ref 4.14–5.8)
SODIUM SERPL-SCNC: 146 MMOL/L (ref 134–144)
TRIGL SERPL-MCNC: 100 MG/DL (ref 0–149)
TSH SERPL DL<=0.005 MIU/L-ACNC: 0.89 UIU/ML (ref 0.45–4.5)
URATE SERPL-MCNC: 7.1 MG/DL (ref 3.7–8.6)
VIT B12 SERPL-MCNC: 351 PG/ML (ref 232–1245)
VLDLC SERPL CALC-MCNC: 20 MG/DL (ref 5–40)
WBC # BLD AUTO: 7.6 X10E3/UL (ref 3.4–10.8)

## 2022-07-31 ENCOUNTER — HOSPITAL ENCOUNTER (EMERGENCY)
Age: 52
Discharge: HOME | End: 2022-07-31
Attending: STUDENT IN AN ORGANIZED HEALTH CARE EDUCATION/TRAINING PROGRAM
Payer: COMMERCIAL

## 2022-07-31 VITALS
DIASTOLIC BLOOD PRESSURE: 85 MMHG | BODY MASS INDEX: 25.9 KG/M2 | HEIGHT: 71 IN | RESPIRATION RATE: 16 BRPM | OXYGEN SATURATION: 100 % | TEMPERATURE: 98.4 F | WEIGHT: 185 LBS | SYSTOLIC BLOOD PRESSURE: 127 MMHG | HEART RATE: 90 BPM

## 2022-07-31 DIAGNOSIS — M54.40 ACUTE BILATERAL LOW BACK PAIN WITH SCIATICA, SCIATICA LATERALITY UNSPECIFIED: Primary | ICD-10-CM

## 2022-07-31 PROCEDURE — 99284 EMERGENCY DEPT VISIT MOD MDM: CPT

## 2022-07-31 PROCEDURE — 74011250637 HC RX REV CODE- 250/637: Performed by: STUDENT IN AN ORGANIZED HEALTH CARE EDUCATION/TRAINING PROGRAM

## 2022-07-31 PROCEDURE — 74011000250 HC RX REV CODE- 250: Performed by: STUDENT IN AN ORGANIZED HEALTH CARE EDUCATION/TRAINING PROGRAM

## 2022-07-31 PROCEDURE — 74011250636 HC RX REV CODE- 250/636: Performed by: STUDENT IN AN ORGANIZED HEALTH CARE EDUCATION/TRAINING PROGRAM

## 2022-07-31 PROCEDURE — 96372 THER/PROPH/DIAG INJ SC/IM: CPT

## 2022-07-31 RX ORDER — OXYCODONE AND ACETAMINOPHEN 5; 325 MG/1; MG/1
1 TABLET ORAL
Qty: 9 TABLET | Refills: 0 | Status: SHIPPED | OUTPATIENT
Start: 2022-07-31 | End: 2022-08-02 | Stop reason: ALTCHOICE

## 2022-07-31 RX ORDER — CYCLOBENZAPRINE HCL 5 MG
5 TABLET ORAL
Qty: 9 TABLET | Refills: 0 | Status: SHIPPED | OUTPATIENT
Start: 2022-07-31 | End: 2022-08-02 | Stop reason: SDUPTHER

## 2022-07-31 RX ORDER — LIDOCAINE 4 G/100G
PATCH TOPICAL
Qty: 15 EACH | Refills: 0 | Status: SHIPPED | OUTPATIENT
Start: 2022-07-31

## 2022-07-31 RX ORDER — CYCLOBENZAPRINE HCL 10 MG
5 TABLET ORAL ONCE
Status: COMPLETED | OUTPATIENT
Start: 2022-07-31 | End: 2022-07-31

## 2022-07-31 RX ORDER — KETOROLAC TROMETHAMINE 15 MG/ML
15 INJECTION, SOLUTION INTRAMUSCULAR; INTRAVENOUS ONCE
Status: COMPLETED | OUTPATIENT
Start: 2022-07-31 | End: 2022-07-31

## 2022-07-31 RX ORDER — LIDOCAINE 4 G/100G
1 PATCH TOPICAL ONCE
Status: DISCONTINUED | OUTPATIENT
Start: 2022-07-31 | End: 2022-08-01 | Stop reason: HOSPADM

## 2022-07-31 RX ADMIN — CYCLOBENZAPRINE HYDROCHLORIDE 5 MG: 10 TABLET, FILM COATED ORAL at 22:29

## 2022-07-31 RX ADMIN — KETOROLAC TROMETHAMINE 15 MG: 15 INJECTION, SOLUTION INTRAMUSCULAR; INTRAVENOUS at 22:29

## 2022-07-31 NOTE — Clinical Note
[address] [facility] EMERGENCY DEPT  [address] [address] [phone]    Work/School Note    Date: 7/31/2022    To Whom It May concern:      Senait Poole was seen and treated today in the emergency room by the following provider(s):  Attending Provider: Zaheer Ambrose MD.      Senait Poole is excused from work/school on 07/31/22. He is clear to return to work/school on 08/01/22.         Sincerely,          Delonte Maldonado RN

## 2022-07-31 NOTE — Clinical Note
[address] [facility] EMERGENCY DEPT  [address] [address] [phone]    Work/School Note    Date: 7/31/2022    To Whom It May concern:      Valentino Colorado was seen and treated today in the emergency room by the following provider(s):  Attending Provider: Sheridan Moore MD.      Valentino Colorado is excused from work/school on 07/31/22. He is clear to return to work/school on 08/01/22.         Sincerely,          Chata Schreiber MD

## 2022-08-01 NOTE — DISCHARGE INSTRUCTIONS
Please take medications as prescribed. Please return to the ER with any new or worsening symptoms or any other concerns. Please return to the Emergency Department if you develop a fever, chills, cannot eat or drink due to nausea or vomiting, or if any of your symptoms worsen. Also, It is extremely important that you follow-up with a primary care physician and if you do not have one currently use the contact information provided to obtain an appointment. If none was provided please call the number on the back of your insurance card to locate a Primary care doctor. Many offices have \"cancellation lists\" that you can ask to be placed on; should a patient with an earlier appointment cancel you will be notified to take their place. Please return to the Emergency Room immediately if your symptoms worsen. Please carefully read all discharge instructions    [email or web address]    What are GoodRx coupons? GoodRx coupons will help you pay less than the cash price for your prescription. [employer] Mor free to use and are accepted at virtually every [place] pharmacy.   Your pharmacist will know how to enter the codes on the coupon to pull up the lowest discount available
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
## 2022-08-01 NOTE — ED TRIAGE NOTES
Pt.AOx4, slow gait due to pain, reports hx of \"back problems\", reports moving objects @ home Thursday PM and has had intense LBP since, no relief with heat pad/ice/acetaminophen/motrin use @ home

## 2022-08-02 ENCOUNTER — OFFICE VISIT (OUTPATIENT)
Dept: FAMILY MEDICINE CLINIC | Age: 52
End: 2022-08-02
Payer: COMMERCIAL

## 2022-08-02 ENCOUNTER — HOSPITAL ENCOUNTER (OUTPATIENT)
Dept: GENERAL RADIOLOGY | Age: 52
Discharge: HOME | End: 2022-08-02
Payer: COMMERCIAL

## 2022-08-02 VITALS
HEIGHT: 71 IN | BODY MASS INDEX: 26.6 KG/M2 | SYSTOLIC BLOOD PRESSURE: 124 MMHG | TEMPERATURE: 98.2 F | DIASTOLIC BLOOD PRESSURE: 86 MMHG | HEART RATE: 116 BPM | WEIGHT: 190 LBS | OXYGEN SATURATION: 99 % | RESPIRATION RATE: 18 BRPM

## 2022-08-02 DIAGNOSIS — M54.42 ACUTE MIDLINE LOW BACK PAIN WITH LEFT-SIDED SCIATICA: Primary | ICD-10-CM

## 2022-08-02 DIAGNOSIS — M54.42 ACUTE MIDLINE LOW BACK PAIN WITH LEFT-SIDED SCIATICA: ICD-10-CM

## 2022-08-02 PROCEDURE — 72100 X-RAY EXAM L-S SPINE 2/3 VWS: CPT

## 2022-08-02 PROCEDURE — 99214 OFFICE O/P EST MOD 30 MIN: CPT | Performed by: FAMILY MEDICINE

## 2022-08-02 RX ORDER — TRAMADOL HYDROCHLORIDE 50 MG/1
50 TABLET ORAL
Qty: 12 TABLET | Refills: 0 | Status: SHIPPED | OUTPATIENT
Start: 2022-08-02 | End: 2022-08-05

## 2022-08-02 RX ORDER — GABAPENTIN 100 MG/1
100 CAPSULE ORAL 3 TIMES DAILY
Qty: 90 CAPSULE | Refills: 0 | Status: SHIPPED | OUTPATIENT
Start: 2022-08-02

## 2022-08-02 RX ORDER — CYCLOBENZAPRINE HCL 5 MG
5 TABLET ORAL
Qty: 30 TABLET | Refills: 0 | Status: SHIPPED | OUTPATIENT
Start: 2022-08-02 | End: 2022-08-05

## 2022-08-02 RX ORDER — METHYLPREDNISOLONE 4 MG/1
TABLET ORAL
Qty: 1 DOSE PACK | Refills: 0 | Status: SHIPPED | OUTPATIENT
Start: 2022-08-02
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
## 2022-08-02 NOTE — PROGRESS NOTES
Chief Complaint   Patient presents with    Back Pain     Follow [facility] for lower back pain (onset July 28,2022 Thursday)         1. \"Have you been to the ER, urgent care clinic since your last visit? Hospitalized since your last visit? \" Yes When: 07- Where: ED Reason for visit: back pain     2. \"Have you seen or consulted any other health care providers outside of the [address] [place] [address] since your last visit? \" No     3. For patients aged 39-70: Has the patient had a colonoscopy / FIT/ Cologuard? No      If the patient is female:    4. For patients aged 41-77: Has the patient had a mammogram within the past 2 years? NA - based on age or sex      11. For patients aged 21-65: Has the patient had a pap smear?  NA - based on age or sex

## 2022-08-02 NOTE — LETTER
NOTIFICATION RETURN TO WORK / SCHOOL    8/2/2022 11:03 AM    Mr. Terry Ortiz  [address] [address]      To Whom It May Concern:    Terry Ortiz is currently under the care of [facility]. He will return to work/school on: 8/8/2022    If there are questions or concerns please have the patient contact our office.         Sincerely,      Abhilash Alexander MD